# Patient Record
Sex: MALE | Race: WHITE | NOT HISPANIC OR LATINO | URBAN - METROPOLITAN AREA
[De-identification: names, ages, dates, MRNs, and addresses within clinical notes are randomized per-mention and may not be internally consistent; named-entity substitution may affect disease eponyms.]

---

## 2018-08-09 ENCOUNTER — INPATIENT (INPATIENT)
Facility: HOSPITAL | Age: 53
LOS: 1 days | Discharge: HOME | DRG: 247 | End: 2018-08-11
Attending: INTERNAL MEDICINE | Admitting: INTERNAL MEDICINE
Payer: COMMERCIAL

## 2018-08-09 VITALS
OXYGEN SATURATION: 97 % | RESPIRATION RATE: 18 BRPM | DIASTOLIC BLOOD PRESSURE: 111 MMHG | TEMPERATURE: 98 F | HEART RATE: 90 BPM | SYSTOLIC BLOOD PRESSURE: 151 MMHG | WEIGHT: 160.94 LBS

## 2018-08-09 DIAGNOSIS — I21.4 NON-ST ELEVATION (NSTEMI) MYOCARDIAL INFARCTION: ICD-10-CM

## 2018-08-09 DIAGNOSIS — R63.8 OTHER SYMPTOMS AND SIGNS CONCERNING FOOD AND FLUID INTAKE: ICD-10-CM

## 2018-08-09 DIAGNOSIS — Z91.89 OTHER SPECIFIED PERSONAL RISK FACTORS, NOT ELSEWHERE CLASSIFIED: ICD-10-CM

## 2018-08-09 DIAGNOSIS — Z29.9 ENCOUNTER FOR PROPHYLACTIC MEASURES, UNSPECIFIED: ICD-10-CM

## 2018-08-09 LAB
ALBUMIN SERPL ELPH-MCNC: 3.8 G/DL — SIGNIFICANT CHANGE UP (ref 3.4–5)
ALP SERPL-CCNC: 83 U/L — SIGNIFICANT CHANGE UP (ref 40–120)
ALT FLD-CCNC: 47 U/L — HIGH (ref 12–42)
ANION GAP SERPL CALC-SCNC: 4 MMOL/L — LOW (ref 9–16)
APTT BLD: 158.3 SEC — CRITICAL HIGH (ref 27.5–37.4)
APTT BLD: 33.2 SEC — SIGNIFICANT CHANGE UP (ref 27.5–36.5)
AST SERPL-CCNC: 38 U/L — HIGH (ref 15–37)
BASOPHILS NFR BLD AUTO: 0.5 % — SIGNIFICANT CHANGE UP (ref 0–2)
BILIRUB SERPL-MCNC: 0.3 MG/DL — SIGNIFICANT CHANGE UP (ref 0.2–1.2)
BUN SERPL-MCNC: 10 MG/DL — SIGNIFICANT CHANGE UP (ref 7–23)
CALCIUM SERPL-MCNC: 9.3 MG/DL — SIGNIFICANT CHANGE UP (ref 8.5–10.5)
CHLORIDE SERPL-SCNC: 103 MMOL/L — SIGNIFICANT CHANGE UP (ref 96–108)
CK MB CFR SERPL CALC: 28.1 NG/ML — HIGH (ref 0–6.7)
CK SERPL-CCNC: 334 U/L — HIGH (ref 30–200)
CO2 SERPL-SCNC: 32 MMOL/L — HIGH (ref 22–31)
CREAT SERPL-MCNC: 0.87 MG/DL — SIGNIFICANT CHANGE UP (ref 0.5–1.3)
EOSINOPHIL NFR BLD AUTO: 3.9 % — SIGNIFICANT CHANGE UP (ref 0–6)
GLUCOSE SERPL-MCNC: 98 MG/DL — SIGNIFICANT CHANGE UP (ref 70–99)
HCT VFR BLD CALC: 47.9 % — SIGNIFICANT CHANGE UP (ref 39–50)
HGB BLD-MCNC: 15.9 G/DL — SIGNIFICANT CHANGE UP (ref 13–17)
IMM GRANULOCYTES NFR BLD AUTO: 0.3 % — SIGNIFICANT CHANGE UP (ref 0–1.5)
INR BLD: 1 — SIGNIFICANT CHANGE UP (ref 0.88–1.16)
LYMPHOCYTES # BLD AUTO: 26.7 % — SIGNIFICANT CHANGE UP (ref 13–44)
MCHC RBC-ENTMCNC: 28.6 PG — SIGNIFICANT CHANGE UP (ref 27–34)
MCHC RBC-ENTMCNC: 33.2 G/DL — SIGNIFICANT CHANGE UP (ref 32–36)
MCV RBC AUTO: 86.2 FL — SIGNIFICANT CHANGE UP (ref 80–100)
MONOCYTES NFR BLD AUTO: 9.7 % — SIGNIFICANT CHANGE UP (ref 2–14)
NEUTROPHILS NFR BLD AUTO: 58.9 % — SIGNIFICANT CHANGE UP (ref 43–77)
PLATELET # BLD AUTO: 308 K/UL — SIGNIFICANT CHANGE UP (ref 150–400)
POTASSIUM SERPL-MCNC: 4.3 MMOL/L — SIGNIFICANT CHANGE UP (ref 3.5–5.3)
POTASSIUM SERPL-SCNC: 4.3 MMOL/L — SIGNIFICANT CHANGE UP (ref 3.5–5.3)
PROT SERPL-MCNC: 7.9 G/DL — SIGNIFICANT CHANGE UP (ref 6.4–8.2)
PROTHROM AB SERPL-ACNC: 11 SEC — SIGNIFICANT CHANGE UP (ref 9.8–12.7)
RBC # BLD: 5.56 M/UL — SIGNIFICANT CHANGE UP (ref 4.2–5.8)
RBC # FLD: 13.9 % — SIGNIFICANT CHANGE UP (ref 10.3–16.9)
SODIUM SERPL-SCNC: 139 MMOL/L — SIGNIFICANT CHANGE UP (ref 132–145)
TROPONIN I SERPL-MCNC: 0.65 NG/ML — CRITICAL HIGH (ref 0.02–0.06)
TROPONIN T SERPL-MCNC: 0.45 NG/ML — CRITICAL HIGH (ref 0–0.01)
WBC # BLD: 8.8 K/UL — SIGNIFICANT CHANGE UP (ref 3.8–10.5)
WBC # FLD AUTO: 8.8 K/UL — SIGNIFICANT CHANGE UP (ref 3.8–10.5)

## 2018-08-09 PROCEDURE — 93010 ELECTROCARDIOGRAM REPORT: CPT

## 2018-08-09 PROCEDURE — 71046 X-RAY EXAM CHEST 2 VIEWS: CPT | Mod: 26

## 2018-08-09 PROCEDURE — 99223 1ST HOSP IP/OBS HIGH 75: CPT | Mod: AI

## 2018-08-09 PROCEDURE — 99220: CPT

## 2018-08-09 PROCEDURE — 93010 ELECTROCARDIOGRAM REPORT: CPT | Mod: 59

## 2018-08-09 RX ORDER — METOPROLOL TARTRATE 50 MG
12.5 TABLET ORAL
Qty: 0 | Refills: 0 | Status: DISCONTINUED | OUTPATIENT
Start: 2018-08-09 | End: 2018-08-09

## 2018-08-09 RX ORDER — TICAGRELOR 90 MG/1
180 TABLET ORAL ONCE
Qty: 0 | Refills: 0 | Status: COMPLETED | OUTPATIENT
Start: 2018-08-09 | End: 2018-08-09

## 2018-08-09 RX ORDER — METOPROLOL TARTRATE 50 MG
12.5 TABLET ORAL
Qty: 0 | Refills: 0 | Status: DISCONTINUED | OUTPATIENT
Start: 2018-08-09 | End: 2018-08-11

## 2018-08-09 RX ORDER — HEPARIN SODIUM 5000 [USP'U]/ML
4400 INJECTION INTRAVENOUS; SUBCUTANEOUS EVERY 6 HOURS
Qty: 0 | Refills: 0 | Status: DISCONTINUED | OUTPATIENT
Start: 2018-08-09 | End: 2018-08-10

## 2018-08-09 RX ORDER — ATORVASTATIN CALCIUM 80 MG/1
40 TABLET, FILM COATED ORAL AT BEDTIME
Qty: 0 | Refills: 0 | Status: DISCONTINUED | OUTPATIENT
Start: 2018-08-09 | End: 2018-08-11

## 2018-08-09 RX ORDER — SODIUM CHLORIDE 9 MG/ML
3 INJECTION INTRAMUSCULAR; INTRAVENOUS; SUBCUTANEOUS ONCE
Qty: 0 | Refills: 0 | Status: COMPLETED | OUTPATIENT
Start: 2018-08-09 | End: 2018-08-09

## 2018-08-09 RX ORDER — HEPARIN SODIUM 5000 [USP'U]/ML
INJECTION INTRAVENOUS; SUBCUTANEOUS
Qty: 25000 | Refills: 0 | Status: DISCONTINUED | OUTPATIENT
Start: 2018-08-09 | End: 2018-08-09

## 2018-08-09 RX ORDER — HEPARIN SODIUM 5000 [USP'U]/ML
4400 INJECTION INTRAVENOUS; SUBCUTANEOUS ONCE
Qty: 0 | Refills: 0 | Status: COMPLETED | OUTPATIENT
Start: 2018-08-09 | End: 2018-08-09

## 2018-08-09 RX ORDER — HEPARIN SODIUM 5000 [USP'U]/ML
750 INJECTION INTRAVENOUS; SUBCUTANEOUS
Qty: 25000 | Refills: 0 | Status: DISCONTINUED | OUTPATIENT
Start: 2018-08-10 | End: 2018-08-10

## 2018-08-09 RX ORDER — TICAGRELOR 90 MG/1
90 TABLET ORAL
Qty: 0 | Refills: 0 | Status: DISCONTINUED | OUTPATIENT
Start: 2018-08-10 | End: 2018-08-11

## 2018-08-09 RX ADMIN — HEPARIN SODIUM 4400 UNIT(S): 5000 INJECTION INTRAVENOUS; SUBCUTANEOUS at 17:44

## 2018-08-09 RX ADMIN — TICAGRELOR 90 MILLIGRAM(S): 90 TABLET ORAL at 17:47

## 2018-08-09 RX ADMIN — SODIUM CHLORIDE 3 MILLILITER(S): 9 INJECTION INTRAMUSCULAR; INTRAVENOUS; SUBCUTANEOUS at 16:28

## 2018-08-09 RX ADMIN — HEPARIN SODIUM 900 UNIT(S)/HR: 5000 INJECTION INTRAVENOUS; SUBCUTANEOUS at 17:44

## 2018-08-09 RX ADMIN — ATORVASTATIN CALCIUM 40 MILLIGRAM(S): 80 TABLET, FILM COATED ORAL at 21:50

## 2018-08-09 RX ADMIN — Medication 12.5 MILLIGRAM(S): at 21:50

## 2018-08-09 NOTE — ED PROVIDER NOTE - CARDIAC, MLM
Normal rate, regular rhythm.  Heart sounds S1, S2.  No murmurs, rubs or gallops. +2 bilateral pulses.

## 2018-08-09 NOTE — ED ADULT NURSE REASSESSMENT NOTE - NS ED NURSE REASSESS COMMENT FT1
Patient is resting in stretcher in no acute distress. Maintained on cardiac monitor. Pt denies any chest pain or shortness of breath. Safety measures maintained. Will continue to monitor.

## 2018-08-09 NOTE — ED ADULT NURSE REASSESSMENT NOTE - NS ED NURSE REASSESS COMMENT FT1
Patient left ER in stable condition via stretcher with [facility] ambulance. Heparin drip infusing to right AC, site benign.

## 2018-08-09 NOTE — ED ADULT TRIAGE NOTE - CHIEF COMPLAINT QUOTE
Pt biba for [facility] md for c/o chest pain. pt states its been happening all month but today was worse. given asa 325mg and ekg done in ambulance.

## 2018-08-09 NOTE — ED PROVIDER NOTE - MEDICAL DECISION MAKING DETAILS
34 y/o male smoker with no PMHx presents with chest cramping radiating to the bilateral arms. Will do ACS work-up and if troponin negative, will have patient f/u with cardiology.

## 2018-08-09 NOTE — ED PROVIDER NOTE - CRITICAL CARE INDICATION, MLM
patient was critically ill... Patient was critically ill with a high probability of imminent or life threatening deterioration. Pt has NSTEMI given heparin pt having recurrent cramping chest pain repeating ekg currently.

## 2018-08-09 NOTE — ED CDU PROVIDER INITIAL DAY NOTE - MEDICAL DECISION MAKING DETAILS
32 y/o male smoker, no FH of MI with no PMHx presents with chest cramping radiating to the bilateral arms. Concern for ACS will get 2 troponin. Will repeat ekg in 4-6 hours. Pt got aspirin at outside hospital.

## 2018-08-09 NOTE — ED ADULT NURSE NOTE - CHIEF COMPLAINT QUOTE
Pt biba for [facility] md for c/o chest pain. pt states its been happening all month but today was worse. given asa 325mg and ekg done in ambulance.

## 2018-08-09 NOTE — H&P ADULT - PROBLEM SELECTOR PLAN 4
1) PCP Contacted on Admission: (Y/N) --> No. Name & Phone #:  Roni Anderson [phone]  2) Date of Contact with PCP:  3) PCP Contacted at Discharge: (Y/N)  4) Summary of Handoff Given to PCP:   5) Post-Discharge Appointment Date and Location:

## 2018-08-09 NOTE — ED ADULT NURSE NOTE - NSIMPLEMENTINTERV_GEN_ALL_ED
Implemented All Universal Safety Interventions:  [place] to call system. Call bell, personal items and telephone within reach. Instruct patient to call for assistance. Room bathroom lighting operational. Non-slip footwear when patient is off stretcher. Physically safe environment: no spills, clutter or unnecessary equipment. Stretcher in lowest position, wheels locked, appropriate side rails in place.

## 2018-08-09 NOTE — PATIENT PROFILE ADULT. - PURPOSEFUL PROACTIVE ROUNDING
I reviewed the H&P, I examined the patient, and there are no changes in the patient's condition.  VS were reviewed.    Vitals:    07/05/17 0929   BP: 129/71   Pulse: 71   Resp: 16   Temp: 97.6 °F (36.4 °C)      Patient

## 2018-08-09 NOTE — ED CDU PROVIDER INITIAL DAY NOTE - DETAILS
34 y/o male smoker, no FH of MI with no PMHx presents with chest cramping radiating to the bilateral arms. Concern for ACS will get 2 troponin

## 2018-08-09 NOTE — H&P ADULT - ATTENDING COMMENTS
Assessment: Patient personally seen and examined myself during rounds with the Physician Assistant/House Staff/Nurse Practitioner   ON DATE 8/9/18  Physician Assistant/House Staff/Nurse Practitioner note read, including vitals, physical findings, laboratory data, and radiological reports.   Revisions included below.   Direct personal management at bed side and extensive interpretation of the data.    Plan was outlined and discussed in details with the Physician Assistant/House Staff/Nurse Practitioner.    Decision making of high complexity   Risk high of complications, morbidity, and/or mortality  Assessment and Action taken for acute disease activity to reflect the level of care provided:  -Hemodynamic evaluation and support  -ACS assessment and treatment as applicable  -Heart failure assessment and treatment as applicable  -Cardiac Telemetry reviewed  -Medication reconciliation  -Review laboratory data  -EKG reviewed - no stemi  -Echo ordered  -Interdisciplinary discussion with IC / EP / HF / CTS teams as needed  TIME SPENT in evaluation and management, reassessments, review and interpretation of labs and x-rays, and hemodynamic management, formulating a plan and coordinating care: ___70____ MIN.  Time does not include procedural time.    Odell Alexis MD  Cardiology    Mobile: [phone]  Office: [phone]  [address] [place], [place] [address]

## 2018-08-09 NOTE — H&P ADULT - NSHPLABSRESULTS_GEN_ALL_CORE
.  LABS:                         15.9   8.8   )-----------( 308      ( 09 Aug 2018 16:26 )             47.9     08-09    139  |  103  |  10  ----------------------------<  98  4.3   |  32<H>  |  0.87    Ca    9.3      09 Aug 2018 16:26    TPro  7.9  /  Alb  3.8  /  TBili  0.3  /  DBili  x   /  AST  38<H>  /  ALT  47<H>  /  AlkPhos  83  08-09    PT/INR - ( 09 Aug 2018 17:51 )   PT: 11.0 sec;   INR: 1.00          PTT - ( 09 Aug 2018 17:51 )  PTT:33.2 sec    CARDIAC MARKERS ( 09 Aug 2018 20:10 )  x     / 0.45 ng/mL / x     / x     / x      CARDIAC MARKERS ( 09 Aug 2018 16:26 )  0.647 ng/mL / x     / x     / x     / x                RADIOLOGY, EKG & ADDITIONAL TESTS: Reviewed.

## 2018-08-09 NOTE — H&P ADULT - PROBLEM SELECTOR PLAN 1
Chest pain with radiation to L arm consistent, worsening over 6 weeks. ROMERO score 2 for 2 anginal episodes today and elevated trops.  - EKG with T wave flattening in leads III, aVF and V5, V6. No ST depressions.  - [facility], continue to trend q4-6 hrs  - s/p heparin load and on heparin gtt, monitor PTT q4-6hrs  - Lipitor 80mg  - metoprolol tartrate 12.5mg BID with holding parameters  - s/p ASA 325mg and Brilinta 180mg load  - NPO after MN for cath tomorrow Chest pain with radiation to L arm consistent, worsening over 6 weeks. ROMERO score 2 for 2 anginal episodes today and elevated trops.  - EKG with T wave flattening in leads III, aVF and V5, V6. No ST depressions.  - [place] [facility] [place] [facility] [place] 0.[place], continue to trend q4-6 hrs  - s/p heparin load and on heparin gtt, monitor PTT q4-6hrs  - Lipitor 40mg  - metoprolol tartrate 12.5mg BID with holding parameters  - s/p ASA 325mg and Brilinta 180mg load  - NPO after MN for cath tomorrow Chest pain with radiation to L arm consistent, worsening over 6 weeks. ROMERO score 2 for 2 anginal episodes today and elevated trops.  - EKG with T wave flattening in leads III, aVF and V5, V6. No ST depressions.  - [facility] 0.[facility], continue to trend q4-6 hrs  - s/p heparin load and on heparin gtt, monitor PTT q4-6hrs  - Lipitor 40mg  - metoprolol tartrate 12.5mg BID with holding parameters  - s/p ASA 325mg and Brilinta 180mg load  - NPO after MN for cath tomorrow  - consider initiating ACE/ARB before discharge Chest pain with radiation to L arm consistent, worsening over 6 weeks. ROMERO score 2 for 2 anginal episodes today and elevated trops.  - EKG with T wave flattening in leads III, aVF and V5, V6. No ST depressions.  - [facility], continue to trend q4-6 hrs  - s/p heparin load and on heparin gtt, monitor PTT q4-6hrs  - Lipitor 40mg  - metoprolol tartrate 12.5mg BID with holding parameters  - s/p ASA 325mg and Brilinta 180mg load  - Brilinta 90mg BID  - NPO after MN for cath tomorrow  - consider initiating ACE/ARB before discharge

## 2018-08-09 NOTE — H&P ADULT - ASSESSMENT
53M with distant history of asthma, no other PMH, presenting with intermittent chest pain radiating to L arm worsening over the last 6 weeks, transferred from [facility] for NSTEMI management.

## 2018-08-09 NOTE — ED PROVIDER NOTE - OBJECTIVE STATEMENT
52 y/o male (smoker, no FHx of MI, no Hx of diabetes, HTN, or HLD) presents to ED c/o CP. Patient s/p visiting the [facility] and getting 325mg aspirin. Presents with chest cramping that radiates to the bilateral shoulders and down the bilateral arms. Patient states that symptoms have been occurring weekly for the past month without exertion, diaphoresis, or any other associated symptoms, including SOB, back pain, nausea, vomiting, diarrhea, or abd pain. States that symptoms usually last for about 10-15 minutes, and had 2 episodes of pain today, the first at 9:30AM and the second at 2:30PM.

## 2018-08-09 NOTE — H&P ADULT - NSHPSOCIALHISTORY_GEN_ALL_CORE
active current smoker 36 pack-yrs, intermittent marijuana use, no current EtOH use, no other drug use

## 2018-08-09 NOTE — H&P ADULT - HISTORY OF PRESENT ILLNESS
53M with distant history of asthma, no other PMH, presenting with intermittent chest pain worsening over the last 6 weeks. Pt states that he is at rest when these chest pains emerge, initially lasting for 5 minutes with no recurrence for days, but now lasting longer for about 20 minutes and happening more frequently. Pain initially starts on his bilateral shoulders and goes to his chest, then radiates down his L arm. He went to [facility] today as he had 2 episodes of chest pain without any associated diaphoresis, nausea, or vomiting. Denies any chest pain prior to 6 weeks ago. +chronic night sweats, No fevers/chills, cough, palpitations, abdominal pain, diarhea/constipation, dysuria.    ED Vitals: T 97.6F, /111, HR 90, RR 18, O2 97% on RA  ED adm: heparin bolus and heparin gtt, Brilinta 180mg x1, PO aspirin 325mg on EMS  ED Labs: Troponin I 0.647

## 2018-08-10 ENCOUNTER — TRANSCRIPTION ENCOUNTER (OUTPATIENT)
Age: 53
End: 2018-08-10

## 2018-08-10 LAB
ANION GAP SERPL CALC-SCNC: 12 MMOL/L — SIGNIFICANT CHANGE UP (ref 5–17)
APTT BLD: 32.1 SEC — SIGNIFICANT CHANGE UP (ref 27.5–37.4)
APTT BLD: 36.7 SEC — SIGNIFICANT CHANGE UP (ref 27.5–37.4)
APTT BLD: 49.1 SEC — HIGH (ref 27.5–37.4)
BUN SERPL-MCNC: 8 MG/DL — SIGNIFICANT CHANGE UP (ref 7–23)
CALCIUM SERPL-MCNC: 9.7 MG/DL — SIGNIFICANT CHANGE UP (ref 8.4–10.5)
CHLORIDE SERPL-SCNC: 101 MMOL/L — SIGNIFICANT CHANGE UP (ref 96–108)
CHOLEST SERPL-MCNC: 203 MG/DL — HIGH (ref 10–199)
CK MB CFR SERPL CALC: 12.5 NG/ML — HIGH (ref 0–6.7)
CK MB CFR SERPL CALC: 21.1 NG/ML — HIGH (ref 0–6.7)
CK MB CFR SERPL CALC: 28.3 NG/ML — HIGH (ref 0–6.7)
CK SERPL-CCNC: 184 U/L — SIGNIFICANT CHANGE UP (ref 30–200)
CK SERPL-CCNC: 322 U/L — HIGH (ref 30–200)
CO2 SERPL-SCNC: 26 MMOL/L — SIGNIFICANT CHANGE UP (ref 22–31)
CREAT SERPL-MCNC: 0.77 MG/DL — SIGNIFICANT CHANGE UP (ref 0.5–1.3)
GLUCOSE SERPL-MCNC: 101 MG/DL — HIGH (ref 70–99)
HBA1C BLD-MCNC: 5.9 % — HIGH (ref 4–5.6)
HCT VFR BLD CALC: 50.4 % — HIGH (ref 39–50)
HDLC SERPL-MCNC: 49 MG/DL — SIGNIFICANT CHANGE UP (ref 40–125)
HGB BLD-MCNC: 16.2 G/DL — SIGNIFICANT CHANGE UP (ref 13–17)
INR BLD: 1.04 — SIGNIFICANT CHANGE UP (ref 0.88–1.16)
INR BLD: 1.04 — SIGNIFICANT CHANGE UP (ref 0.88–1.16)
LIPID PNL WITH DIRECT LDL SERPL: 128 MG/DL — SIGNIFICANT CHANGE UP
MAGNESIUM SERPL-MCNC: 2.2 MG/DL — SIGNIFICANT CHANGE UP (ref 1.6–2.6)
MCHC RBC-ENTMCNC: 28.4 PG — SIGNIFICANT CHANGE UP (ref 27–34)
MCHC RBC-ENTMCNC: 32.1 G/DL — SIGNIFICANT CHANGE UP (ref 32–36)
MCV RBC AUTO: 88.3 FL — SIGNIFICANT CHANGE UP (ref 80–100)
PCP SPEC-MCNC: SIGNIFICANT CHANGE UP
PHOSPHATE SERPL-MCNC: 3 MG/DL — SIGNIFICANT CHANGE UP (ref 2.5–4.5)
PLATELET # BLD AUTO: 310 K/UL — SIGNIFICANT CHANGE UP (ref 150–400)
POTASSIUM SERPL-MCNC: 4.3 MMOL/L — SIGNIFICANT CHANGE UP (ref 3.5–5.3)
POTASSIUM SERPL-SCNC: 4.3 MMOL/L — SIGNIFICANT CHANGE UP (ref 3.5–5.3)
PROTHROM AB SERPL-ACNC: 11.5 SEC — SIGNIFICANT CHANGE UP (ref 9.8–12.7)
PROTHROM AB SERPL-ACNC: 11.6 SEC — SIGNIFICANT CHANGE UP (ref 9.8–12.7)
RBC # BLD: 5.71 M/UL — SIGNIFICANT CHANGE UP (ref 4.2–5.8)
RBC # FLD: 14.3 % — SIGNIFICANT CHANGE UP (ref 10.3–16.9)
SODIUM SERPL-SCNC: 139 MMOL/L — SIGNIFICANT CHANGE UP (ref 135–145)
TOTAL CHOLESTEROL/HDL RATIO MEASUREMENT: 4.1 RATIO — SIGNIFICANT CHANGE UP (ref 3.4–9.6)
TRIGL SERPL-MCNC: 130 MG/DL — SIGNIFICANT CHANGE UP (ref 10–149)
TROPONIN T SERPL-MCNC: 0.24 NG/ML — CRITICAL HIGH (ref 0–0.01)
TROPONIN T SERPL-MCNC: 0.47 NG/ML — CRITICAL HIGH (ref 0–0.01)
TROPONIN T SERPL-MCNC: 0.73 NG/ML — CRITICAL HIGH (ref 0–0.01)
TSH SERPL-MCNC: 1.86 UIU/ML — SIGNIFICANT CHANGE UP (ref 0.35–4.94)
WBC # BLD: 8.8 K/UL — SIGNIFICANT CHANGE UP (ref 3.8–10.5)
WBC # FLD AUTO: 8.8 K/UL — SIGNIFICANT CHANGE UP (ref 3.8–10.5)

## 2018-08-10 PROCEDURE — 71045 X-RAY EXAM CHEST 1 VIEW: CPT | Mod: 26

## 2018-08-10 PROCEDURE — 92928 PRQ TCAT PLMT NTRAC ST 1 LES: CPT | Mod: LC

## 2018-08-10 PROCEDURE — 99232 SBSQ HOSP IP/OBS MODERATE 35: CPT

## 2018-08-10 PROCEDURE — 93306 TTE W/DOPPLER COMPLETE: CPT | Mod: 26

## 2018-08-10 PROCEDURE — 93458 L HRT ARTERY/VENTRICLE ANGIO: CPT | Mod: 26,XU

## 2018-08-10 PROCEDURE — 93010 ELECTROCARDIOGRAM REPORT: CPT

## 2018-08-10 RX ORDER — ASPIRIN/CALCIUM CARB/MAGNESIUM 324 MG
81 TABLET ORAL DAILY
Qty: 0 | Refills: 0 | Status: DISCONTINUED | OUTPATIENT
Start: 2018-08-10 | End: 2018-08-11

## 2018-08-10 RX ORDER — NICOTINE POLACRILEX 2 MG
1 GUM BUCCAL DAILY
Qty: 0 | Refills: 0 | Status: DISCONTINUED | OUTPATIENT
Start: 2018-08-10 | End: 2018-08-11

## 2018-08-10 RX ORDER — NICOTINE POLACRILEX 2 MG
1 GUM BUCCAL DAILY
Qty: 0 | Refills: 0 | Status: DISCONTINUED | OUTPATIENT
Start: 2018-08-10 | End: 2018-08-10

## 2018-08-10 RX ORDER — SODIUM CHLORIDE 9 MG/ML
1000 INJECTION INTRAMUSCULAR; INTRAVENOUS; SUBCUTANEOUS
Qty: 0 | Refills: 0 | Status: DISCONTINUED | OUTPATIENT
Start: 2018-08-10 | End: 2018-08-10

## 2018-08-10 RX ORDER — SODIUM CHLORIDE 9 MG/ML
1000 INJECTION INTRAMUSCULAR; INTRAVENOUS; SUBCUTANEOUS
Qty: 0 | Refills: 0 | Status: DISCONTINUED | OUTPATIENT
Start: 2018-08-10 | End: 2018-08-11

## 2018-08-10 RX ADMIN — Medication 12.5 MILLIGRAM(S): at 10:21

## 2018-08-10 RX ADMIN — SODIUM CHLORIDE 100 MILLILITER(S): 9 INJECTION INTRAMUSCULAR; INTRAVENOUS; SUBCUTANEOUS at 10:01

## 2018-08-10 RX ADMIN — TICAGRELOR 90 MILLIGRAM(S): 90 TABLET ORAL at 06:36

## 2018-08-10 RX ADMIN — Medication 81 MILLIGRAM(S): at 10:06

## 2018-08-10 RX ADMIN — HEPARIN SODIUM 7.5 UNIT(S)/HR: 5000 INJECTION INTRAVENOUS; SUBCUTANEOUS at 00:24

## 2018-08-10 RX ADMIN — Medication 12.5 MILLIGRAM(S): at 21:07

## 2018-08-10 RX ADMIN — SODIUM CHLORIDE 100 MILLILITER(S): 9 INJECTION INTRAMUSCULAR; INTRAVENOUS; SUBCUTANEOUS at 13:47

## 2018-08-10 RX ADMIN — ATORVASTATIN CALCIUM 40 MILLIGRAM(S): 80 TABLET, FILM COATED ORAL at 21:08

## 2018-08-10 RX ADMIN — TICAGRELOR 90 MILLIGRAM(S): 90 TABLET ORAL at 18:40

## 2018-08-10 NOTE — PROGRESS NOTE ADULT - PROBLEM SELECTOR PLAN 1
Chest pain with radiation to L arm consistent, worsening over 6 weeks. ROMERO score 2 for 2 anginal episodes today and elevated trops.  - EKG with T wave flattening in leads III, aVF and V5, V6. No ST depressions.  - [facility], continue to trend q4-6 hrs  - s/p heparin load and on heparin gtt, monitor PTT q4-6hrs  - Lipitor 40mg  - metoprolol tartrate 12.5mg BID with holding parameters  - s/p ASA 325mg and Brilinta 180mg load  - Brilinta 90mg BID  - NPO after MN for cath tomorrow  - consider initiating ACE/ARB before discharge Typical Chest pain, elevated trops & LHGV EKG v5/v6 depressions consistent with NSTEMI. Pt started on Heparin loading dose/Drip and transferred here for management.  -Cath for today 8/10   -Heparin drip, ASA, Metoprolol tart 12.5 BID, Brilinta 90mg BID, Atorvastatin 40 qd.  -PTT q4-6hr   - Start ACEI/ARB before discharge Typical Chest pain, elevated trops & LHGV EKG v5/v6 depressions consistent with NSTEMI. Pt started on Heparin loading dose/Drip and transferred here for management. A1C 5.9; Lipid Panel: High Chol 203, Upper normal / [id number];  -Cath for today 8/10   -Heparin drip, ASA, Metoprolol tart 12.5 BID, Brilinta 90mg BID, Atorvastatin 40 qd.  -Trop pending peak   -PTT q4-6hr   -Pending TSH   -Pending Utox- although pt denies cocaine use   - Start ACEI/ARB before discharge

## 2018-08-10 NOTE — DISCHARGE NOTE ADULT - CARE PLAN
Principal Discharge DX:	Non-ST elevation (NSTEMI) myocardial infarction  Goal:	to prevent future episode  Assessment and plan of treatment:	You came in with typical anginal chest pain that increased in frequency and duration. You were found to have elevation cardiac enzymes as well as EKG changes consistent with a NSTEMI (heart attack). We started you on medications to decrease further plaque formation and you received a PCI w/ 1 stent placement.   -Follow up with Cardiology Dr. Alexis 8/16/18 4pm and your PCP Dr. Roni Anderson on 8/13/18 so you can be followed in respect to the medications we have prescribed for you as well as preventing reoccurrence.   -We spoke about smoking cessation, and you were agreeable and preferred to start without Nicotine assistance. If you begin to have cravings/start smoking again, contact your PCP for nicotine patch and gum treatment. As previously discussed, smoking increases your risk for future heart attacks, therefore quitting would be a great advancement for your health. In addition, exercise will allopw   -Please follow a low fat/cholesterol diet to prevent future plaque formation.   -Medications: ASA1/day & Brilinta 90 2x/day: Dual antiplatelet therapy required to prevent re-stenosis.  -[id number] daily to ensure cholesterol remains at a low level   -Lopressor 12.5 2x/day  Secondary Diagnosis:	Tobacco dependence Principal Discharge DX:	Non-ST elevation (NSTEMI) myocardial infarction  Goal:	to prevent future episode  Assessment and plan of treatment:	You came in with typical anginal chest pain that increased in frequency and duration. You were found to have elevation cardiac enzymes as well as EKG changes consistent with a NSTEMI (heart attack). We started you on medications to decrease further plaque formation and you received a PCI w/ 1 stent placement.   -Follow up with Cardiology Dr. Alexis 8/16/18 4pm and your PCP Dr. Roni Anderson on 8/13/18 so you can be followed in respect to the medications we have prescribed for you as well as preventing reoccurrence.   -We spoke about smoking cessation, and you were agreeable and preferred to start without Nicotine assistance. If you begin to have cravings/start smoking again, contact your PCP for nicotine patch and gum treatment. As previously discussed, smoking increases your risk for future heart attacks, therefore quitting would be a great advancement for your health.    -Please follow a low fat/cholesterol diet to prevent future plaque formation.   -Medications: ASA1/day & Brilinta 90 2x/day: Dual antiplatelet therapy required for 1 year to prevent re-stenosis.  -[id number] daily to ensure cholesterol remains at a low level   -Toprol 25 daily  Secondary Diagnosis:	Tobacco dependence

## 2018-08-10 NOTE — PROGRESS NOTE ADULT - PROBLEM SELECTOR PLAN 3
F: heparin gtt  E: replete PRN  N: DASH diet, NPO after MN for possible cath F: pre-cath 1L NS fluids   E: replete PRN  N: NPO for procedure/DASH diet

## 2018-08-10 NOTE — DISCHARGE NOTE ADULT - MEDICATION SUMMARY - MEDICATIONS TO TAKE
I will START or STAY ON the medications listed below when I get home from the hospital:    aspirin 81 mg oral delayed release tablet  -- 1 tab(s) by mouth once a day  -- Indication: For NSTEMI    atorvastatin 40 mg oral tablet  -- 1 tab(s) by mouth once a day (at bedtime)  -- Indication: For NSTEMI    ticagrelor 90 mg oral tablet  -- 1 tab(s) by mouth 2 times a day  -- Indication: For NSTEMI    Toprol-XL 25 mg oral tablet, extended release  -- 1 tab(s) by mouth once a day   -- It is very important that you take or use this exactly as directed.  Do not skip doses or discontinue unless directed by your doctor.  May cause drowsiness.  Alcohol may intensify this effect.  Use care when operating dangerous machinery.  Some non-prescription drugs may aggravate your condition.  Read all labels carefully.  If a warning appears, check with your doctor before taking.  Swallow whole.  Do not crush.  Take with food or milk.  This drug may impair the ability to drive or operate machinery.  Use care until you become familiar with its effects.    -- Indication: For NSTEMI

## 2018-08-10 NOTE — DISCHARGE NOTE ADULT - HOSPITAL COURSE
53M with PMH of Anxiety, SocHX of Tobacco use 1ppd, occasional Marijuana use, and Past Heroine use (snorting modality in the 1980s) who presented to [facility] on 8/9/18 with chest pain for 6 week duration that had both increased in duration and frequency, found to have increased Trop/CKMb, EKG showing ST depressions in V5/V6 transferred from [facility] for NSTEMI catheterization/ management along with Heparin loading dose/heparin drip. Patient here was started on ASA, Brilinta, Lipitor, and Lopressor. 8/10 Interventional cath was done: VENKATA placed in pLCX  Proximal RCA 30%. LM and LAD normal. Radial band removed w/o evidence of hematoma. Echo followed showing EF 65% with normal LV wall thickness, movement, and size. TSHnL, A1C 5.9; Lipid Panel: High Chol 203, Upper normal / [id number]. Pt was stable with VSS and ready for discharge.

## 2018-08-10 NOTE — PROGRESS NOTE ADULT - SUBJECTIVE AND OBJECTIVE BOX
Interventional Cardiology PA Precath Note      HPI:      53M smoker with distant history of asthma, no other PMH, presenting with intermittent chest pain worsening over the last 6 weeks. Pt states that he is at rest when these chest pains emerge, initially lasting for 5 minutes with no recurrence for days, but now lasting longer for about 20 minutes and happening more frequently. Pain initially starts on his bilateral shoulders and goes to his chest, then radiates down his L arm. He went to [facility] on 8/9/18 as he had 2 episodes of chest pain without any associated diaphoresis, nausea, or vomiting. Denies any chest pain prior to 6 weeks ago. +chronic night sweats, No fevers/chills, cough, palpitations, abdominal pain, diarrhea/constipation, dysuria.    [facility] ED Vitals: T 97.6F, /111, HR 90, RR 18, O2 97% on RA, Troponin I 0.647.  Pt was given heparin bolus and heparin gtt, Brilinta 180mg x1, PO aspirin 325mg on EMS.      At [facility], Troponin T trend 0.45à0.73.  ECG shows .............  Pt remains CP free at this time.  Pt is now referred for recommended cardiac cathetherization with possible intervention in setting of NSTEMI.     PMH:    PSH:    No Known Allergies      ALL: NKDA, NKFA. Denies shellfish/Contrast dye allergy.  SocHX:   FHx:   MEDS:   atorvastatin 40 milliGRAM(s) Oral at bedtime  heparin  Infusion 750 Unit(s)/Hr IV Continuous <Continuous>  heparin  Injectable 4400 Unit(s) IV Push every 6 hours PRN  metoprolol tartrate 12.5 milliGRAM(s) Oral two times a day  ticagrelor 90 milliGRAM(s) Oral two times a day    T(C): 36.4 (08-10-18 @ 05:53), Max: 36.8 (08-09-18 @ 17:43)  HR: 74 (08-10-18 @ 08:54) (70 - 90)  BP: 122/94 (08-10-18 @ 08:54) (118/94 - 158/95)  RR: 18 (08-10-18 @ 08:54) (16 - 20)  SpO2: 99% (08-10-18 @ 08:54) (97% - 100%)  Wt(kg): --  HEENT: NCAT, EOMI, PERRLA  NECK: No JVD, No carotid bruits B/L, +2 Carotid pulses B/L  PULM:  CTA B/L No W/R/R  CARD: RRR, +S1, +S2, No M/R/G  ABD: ND, +BS, NT, no masses  EXT: Warm, pedal edema yes/no, pitting/non-pitting  NEURO: A & O x 3, no focal neurologic deficits  PULSES:	   B	       R	                FEM         	                                 DP/PT  Right		                                Yes/No     Bruit	    Left		                                        Yes/No     Bruit                                16.2   8.8   )-----------( 310      ( 10 Aug 2018 07:38 )             50.4     08-10    139  |  101  |  8   ----------------------------<  101<H>  4.3   |  26  |  0.77    Ca    9.7      10 Aug 2018 07:37  Phos  3.0     08-10  Mg     2.2     08-10    TPro  7.9  /  Alb  3.8  /  TBili  0.3  /  DBili  x   /  AST  38<H>  /  ALT  47<H>  /  AlkPhos  83  08-09    CARDIAC MARKERS ( 10 Aug 2018 07:37 )  x     / x     / 273 U/L / x     / 21.1 ng/mL  CARDIAC MARKERS ( 10 Aug 2018 00:17 )  x     / 0.73 ng/mL / 322 U/L / x     / 28.3 ng/mL  CARDIAC MARKERS ( 09 Aug 2018 20:10 )  x     / 0.45 ng/mL / 334 U/L / x     / 28.1 ng/mL  CARDIAC MARKERS ( 09 Aug 2018 16:26 )  0.647 ng/mL / x     / x     / x     / x              EKG:					  ASA _____				Mallampati class: _________	            Anginal Class: _________  A/P:        Sedation Plan:   ? None   ? Moderate    ?  Deep    ?  General Anesthesia   Patient Is Suitable Candidate For Sedation?     ? Yes   ? No   ? Not Applicable     Risks & benefits of procedure and sedation and risks and benefits for the alternative therapy have been explained to the patient in layman’s terms including but not limited to: allergic reaction, bleeding, infection, arrhythmia, respiratory compromise, renal and vascular compromise, limb damage, MI, CVA, emergent CABG/Vascular Surgery and death. Informed consent obtained and in chart. Interventional Cardiology PA Precath Note      HPI:      53M smoker with FHx atherosclerotic disease & distant history of asthma 2/2 snorting Heroin, recreational marijuana user with no other PMH, presenting with intermittent chest pain worsening over the last 6 weeks. Pt states that he is at rest when these chest pains emerge, initially lasting for 5 minutes with no recurrence for days, but now lasting longer for about 20 minutes and happening more frequently. Pain initially starts on his bilateral shoulders and goes to his chest, then radiates down his L arm. He went to [facility] on 18 as he had 2 episodes of chest pain without any associated diaphoresis, nausea, or vomiting. Denies any chest pain prior to 6 weeks ago. +chronic night sweats, No fevers/chills, cough, palpitations, abdominal pain, diarrhea/constipation, dysuria, orthopnea/pnd, pedal edema, syncope.  No prior cardiac stress test or ECHO.      [facility] ED Vitals: T 97.6F, /111, HR 90, RR 18, O2 97% on RA, Troponin I 0.647.  Pt was given heparin bolus and heparin gtt, Brilinta 180mg x1, PO aspirin 325mg on EMS.      At [facility], Troponin T trend 0.45à0.73.  ECG shows SR with inferior TW flattening.  Pt remains CP free at this time.  Pt is now referred for recommended cardiac cathetherization with possible intervention in setting of NSTEMI.     PMH:  Anxiety, fractured collar bone  PSH:  none  No Known Allergies      ALL: NKDA, NKFA. Denies shellfish/Contrast dye allergy.  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 ; No current EtOH use x 3 years; Recreational Marijuana user  FHx: Father carotid artery disease, unknown if Heart disease,  age 74;    MEDS:   atorvastatin 40 milliGRAM(s) Oral at bedtime  heparin  Infusion 750 Unit(s)/Hr IV Continuous <Continuous>  heparin  Injectable 4400 Unit(s) IV Push every 6 hours PRN  metoprolol tartrate 12.5 milliGRAM(s) Oral two times a day  ticagrelor 90 milliGRAM(s) Oral two times a day    T(C): 36.4 (08-10-18 @ 05:53), Max: 36.8 (18 @ 17:43)  HR: 74 (08-10-18 @ 08:54) (70 - 90)  BP: 122/94 (08-10-18 @ 08:54) (118/94 - 158/95)  RR: 18 (08-10-18 @ 08:54) (16 - 20)  SpO2: 99% (08-10-18 @ 08:54) (97% - 100%)  Wt(kg): --  HEENT: NCAT, EOMI, PERRLA  NECK: No JVD, No carotid bruits B/L, +2 Carotid pulses B/L  PULM:  CTA B/L No W/R/R  CARD: RRR, +S1, +S2, No M/R/G  ABD: ND, +BS, NT, no masses  EXT: Warm, pedal edema yes/no, pitting/non-pitting  NEURO: A & O x 3, no focal neurologic deficits  PULSES:	   B	       R	                FEM         	          DP/PT  Right	+2 	      +2                  2+ No     Bruit         +1 / +1	    Left	+2 	      +2                  2+ No     Bruit         +1 / + 1                                16.2   8.8   )-----------( 310      ( 10 Aug 2018 07:38 )             50.4     08-10    139  |  101  |  8   ----------------------------<  101<H>  4.3   |  26  |  0.77    Ca    9.7      10 Aug 2018 07:37  Phos  3.0     08-10  Mg     2.2     -10    TPro  7.9  /  Alb  3.8  /  TBili  0.3  /  DBili  x   /  AST  38<H>  /  ALT  47<H>  /  AlkPhos  83  08-09    CARDIAC MARKERS ( 10 Aug 2018 07:37 )  x     / x     / 273 U/L / x     / 21.1 ng/mL  CARDIAC MARKERS ( 10 Aug 2018 00:17 )  x     / 0.73 ng/mL / 322 U/L / x     / 28.3 ng/mL  CARDIAC MARKERS ( 09 Aug 2018 20:10 )  x     / 0.45 ng/mL / 334 U/L / x     / 28.1 ng/mL  CARDIAC MARKERS ( 09 Aug 2018 16:26 )  0.647 ng/mL / x     / x     / x     / x            ASA __3___				Mallampati class: __3_______	            Anginal Class: ___4______        Sedation Plan:   ? None   ? Moderate    ?  Deep    ?  General Anesthesia   Patient Is Suitable Candidate For Sedation?     ? Yes   ? No   ? Not Applicable Interventional Cardiology PA Precath Note      HPI:      53M smoker with FHx atherosclerotic disease & distant history of asthma 2/2 snorting Heroin, recreational marijuana user with no other PMH, presenting with intermittent chest pain worsening over the last 6 weeks. Pt states that he is at rest when these chest pains emerge, initially lasting for 5 minutes with no recurrence for days, but now lasting longer for about 20 minutes and happening more frequently. Pain initially starts on his bilateral shoulders and goes to his chest, then radiates down his L arm. He went to [facility] on 18 as he had 2 episodes of chest pain without any associated diaphoresis, nausea, or vomiting. Denies any chest pain prior to 6 weeks ago. +chronic night sweats, No fevers/chills, cough, palpitations, abdominal pain, diarrhea/constipation, dysuria, orthopnea/pnd, pedal edema, syncope.  No prior cardiac stress test or ECHO.      [facility] ED Vitals: T 97.6F, /111, HR 90, RR 18, O2 97% on RA, Troponin I 0.647.  Pt was given heparin bolus and heparin gtt, Brilinta 180mg x1, PO aspirin 325mg on EMS.      At [facility], Troponin T trend 0.45à0.73.  ECG shows SR with inferior TW flattening.  Pt remains CP free at this time.  Pt is now referred for recommended cardiac cathetherization with possible intervention in setting of NSTEMI.     PMH:  Anxiety, fractured collar bone  PSH:  none    ALL: NKDA, NKFA. Denies shellfish/Contrast dye allergy.  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 ; No current EtOH use x 3 years; Recreational Marijuana user  FHx: Father carotid artery disease, unknown if Heart disease,  age 74;    MEDS:   atorvastatin 40 milliGRAM(s) Oral at bedtime  heparin  Infusion 750 Unit(s)/Hr IV Continuous <Continuous>  heparin  Injectable 4400 Unit(s) IV Push every 6 hours PRN  metoprolol tartrate 12.5 milliGRAM(s) Oral two times a day  ticagrelor 90 milliGRAM(s) Oral two times a day    T(C): 36.4 (08-10-18 @ 05:53), Max: 36.8 (18 @ 17:43)  HR: 74 (08-10-18 @ 08:54) (70 - 90)  BP: 122/94 (08-10-18 @ 08:54) (118/94 - 158/95)  RR: 18 (08-10-18 @ 08:54) (16 - 20)  SpO2: 99% (08-10-18 @ 08:54) (97% - 100%)  Wt(kg): --    General: WDWN laying comfortably in bed in NAD   HEENT: NCAT, EOMI, PERRLA  NECK: No JVD, No carotid bruits B/L, +2 Carotid pulses B/L  PULM:  CTA B/L No W/R/R  CARD: RRR, +S1, +S2, No M/R/G  ABD: soft, ND, +BS, NT, no masses  EXT: Warm, no pedal edema   Skin: multiple tattoos on body; small areas of ecchymosis on left upper arm  NEURO: A & O x 3, no focal neurologic deficits  PULSES:	   B	       R	                FEM         	          DP/PT  Right	+2 	      +2                  2+ No     Bruit         +1 / +1	    Left	+2 	      +2                  2+ No     Bruit         +1 / + 1    Aníbal's Test: Positive b/l; cap refill < 3 sec                            16.2   8.8   )-----------( 310      ( 10 Aug 2018 07:38 )             50.4     08-10    139  |  101  |  8   ----------------------------<  101<H>  4.3   |  26  |  0.77    Ca    9.7      10 Aug 2018 07:37  Phos  3.0     08-10  Mg     2.2     08-10    TPro  7.9  /  Alb  3.8  /  TBili  0.3  /  DBili  x   /  AST  38<H>  /  ALT  47<H>  /  AlkPhos  83  08-09    CARDIAC MARKERS ( 10 Aug 2018 07:37 )  x     / x     / 273 U/L / x     / 21.1 ng/mL  CARDIAC MARKERS ( 10 Aug 2018 00:17 )  x     / 0.73 ng/mL / 322 U/L / x     / 28.3 ng/mL  CARDIAC MARKERS ( 09 Aug 2018 20:10 )  x     / 0.45 ng/mL / 334 U/L / x     / 28.1 ng/mL  CARDIAC MARKERS ( 09 Aug 2018 16:26 )  0.647 ng/mL / x     / x     / x     / x            ASA __3___				Mallampati class: __3_______	            Anginal Class: ___4______        Sedation Plan:   Moderate    Patient Is Suitable Candidate For Sedation?     Yes

## 2018-08-10 NOTE — DISCHARGE NOTE ADULT - PATIENT PORTAL LINK FT
You can access the [email or web address] Patient Portal, offered by [facility], by registering with the following website: [email or web address]

## 2018-08-10 NOTE — PROGRESS NOTE ADULT - ASSESSMENT
53M with PMH of Anxiety, SocHX of Tobacco use 1ppd, occasional Marijuana use, and Past Heroine use (snorting modality in the 1980s) who presented to [facility] with chest pain for 6 week duration that has both increased in duration and frequency found to have increased Trop/CKMb, EKG showing ST depressions in V5/V6 53M with PMH of Anxiety, SocHX of Tobacco use 1ppd, occasional Marijuana use, and Past Heroine use (snorting modality in the 1980s) who presented to [facility] with chest pain for 6 week duration that has both increased in duration and frequency found to have increased Trop/CKMb, EKG showing ST depressions in V5/V6 transferred from [facility] for NSTEMI catheterization/ management.

## 2018-08-10 NOTE — CONSULT NOTE ADULT - SUBJECTIVE AND OBJECTIVE BOX
JOSY OLIVEIRA    [id number]    Patient is a 53y old  Male who presents with a chief complaint of chest pain, r/o ACS (09 Aug 2018 20:08)      HPI:  53M with distant history of asthma, no other PMH, presenting with intermittent chest pain worsening over the last 6 weeks. Pt states that he is at rest when these chest pains emerge, initially lasting for 5 minutes with no recurrence for days, but now lasting longer for about 20 minutes and happening more frequently. Pain initially starts on his bilateral shoulders and goes to his chest, then radiates down his L arm. He went to [facility] today as he had 2 episodes of chest pain without any associated diaphoresis, nausea, or vomiting. Denies any chest pain prior to 6 weeks ago. +chronic night sweats, No fevers/chills, cough, palpitations, abdominal pain, diarhea/constipation, dysuria.    ED Vitals: T 97.6F, /111, HR 90, RR 18, O2 97% on RA  ED adm: heparin bolus and heparin gtt, Brilinta 180mg x1, PO aspirin 325mg on EMS  ED Labs: Troponin I 0.647 (09 Aug 2018 20:08)      PAST MEDICAL & SURGICAL HISTORY:        Social History:    FAMILY HISTORY:  No pertinent family history in first degree relatives      Functional Level Prior to Admission:                          16.2   8.8   )-----------( 310      ( 10 Aug 2018 07:38 )             50.4       08-10    139  |  101  |  8   ----------------------------<  101<H>  4.3   |  26  |  0.77    Ca    9.7      10 Aug 2018 07:37  Phos  3.0     08-10  Mg     2.2     08-10    TPro  7.9  /  Alb  3.8  /  TBili  0.3  /  DBili  x   /  AST  38<H>  /  ALT  47<H>  /  AlkPhos  83  08-09      Vital Signs Last 24 Hrs  T(C): 36.8 (10 Aug 2018 09:24), Max: 36.8 (09 Aug 2018 17:43)  T(F): 98.2 (10 Aug 2018 09:24), Max: 98.2 (09 Aug 2018 17:43)  HR: 74 (10 Aug 2018 08:54) (70 - 90)  BP: 122/94 (10 Aug 2018 08:54) (118/94 - 158/95)  BP(mean): 110 (10 Aug 2018 08:54) (103 - 123)  RR: 18 (10 Aug 2018 08:54) (16 - 20)  SpO2: 99% (10 Aug 2018 08:54) (97% - 100%)      PHYSICAL EXAM:  This 53 y-o male was admitted on 08/09/18 with chest pain and pain in both arms , intermittent for 6 weeks..  h/o asthma      Constitutional:  sitting on edge of bed, alert and stable, in good spirits.    Eyes:  neg.    ENMT:  neg.    Neck:  supple, no neck pain    Breasts:    Back:  no back pain    Respiratory:  no SOB     Cardiovascular:  denies chest pain.  no palpitation    Gastrointestinal: no abdominal pain    Genitourinary:    Rectal:    Extremities: full ROM 4 ext., no joint or aaaaarm pain, no edema, no weakness    Vascular:    Neurological:  no neurological deficits, 5/5.  Independent ambulation    Skin:    Lymph Nodes:    Musculoskeletal:    Psychiatric:            Impression:  NSTEMI    Recommendations:  Independent in all areas.  No PT needs at this time

## 2018-08-10 NOTE — PROGRESS NOTE ADULT - SUBJECTIVE AND OBJECTIVE BOX
53M with distant history of asthma, no other PMH, presenting with intermittent chest pain worsening over the last 6 weeks. Pt states that he is at rest when these chest pains emerge, initially lasting for 5 minutes with no recurrence for days, but now lasting longer for about 20 minutes and happening more frequently. Pain initially starts on his bilateral shoulders and goes to his chest, then radiates down his L arm. He went to [facility] today as he had 2 episodes of chest pain without any associated diaphoresis, nausea, or vomiting. Denies any chest pain prior to 6 weeks ago. +chronic night sweats, No fevers/chills, cough, palpitations, abdominal pain, diarhea/constipation, dysuria.    ED Vitals: T 97.6F, /111, HR 90, RR 18, O2 97% on RA  ED adm: heparin bolus and heparin gtt, Brilinta 180mg x1, PO aspirin 325mg on EMS  ED Labs: Troponin I 0.647        PHYSICAL EXAM:    General:  male in NAD   HEENT: NC/AT, anicteric sclera; MMM  Neck: supple no JVD   Cardiovascular: +S1/S2, RRR  Respiratory: CTA B/L;   Gastrointestinal: soft, NT/ND; +BSx4  Extremities: WWP; no edema  Vascular: 2+ radial, DP/PT pulses B/L  Neurological: AAOx3; no focal deficits    MEDICATIONS:  MEDICATIONS  (STANDING):  aspirin enteric coated 81 milliGRAM(s) Oral daily  atorvastatin 40 milliGRAM(s) Oral at bedtime  heparin  Infusion 750 Unit(s)/Hr (7.5 mL/Hr) IV Continuous <Continuous>  metoprolol tartrate 12.5 milliGRAM(s) Oral two times a day  nicotine - 21 mG/24Hr(s) Patch 1 patch Transdermal daily  sodium chloride 0.9%. 1000 milliLiter(s) (100 mL/Hr) IV Continuous <Continuous>  ticagrelor 90 milliGRAM(s) Oral two times a day    MEDICATIONS  (PRN):  heparin  Injectable 4400 Unit(s) IV Push every 6 hours PRN For aPTT less than 40      ALLERGIES:  Allergies    No Known Allergies    Intolerances        LABS:                        16.2   8.8   )-----------( 310      ( 10 Aug 2018 07:38 )             50.4     08-10    139  |  101  |  8   ----------------------------<  101<H>  4.3   |  26  |  0.77    Ca    9.7      10 Aug 2018 07:37  Phos  3.0     08-10  Mg     2.2     08-10    TPro  7.9  /  Alb  3.8  /  TBili  0.3  /  DBili  x   /  AST  38<H>  /  ALT  47<H>  /  AlkPhos  83  08-09  · Assessment      53M with PMH of Anxiety, SocHX of Tobacco use 1ppd, occasional Marijuana use, and Past Heroine use (snorting modality in the 1980s) who presented to [facility] with chest pain for 6 week duration that has both increased in duration and frequency found to have increased Trop/CKMb, EKG showing ST depressions in V5/V6 transferred from [facility] for NSTEMI catheterization/ management.     Problem/Plan - 1:  ·  Problem: Non-ST elevation (NSTEMI) myocardial infarction.  Plan: Typical Chest pain, elevated trops & [facility] EKG v5/v6 depressions consistent with NSTEMI. Pt started on Heparin loading dose/Drip and transferred here for management. A1C 5.9; Lipid Panel: High Chol 203, Upper normal / UDP613;  -Cath for today 8/10   -Heparin drip, ASA, Metoprolol tart 12.5 BID, Brilinta 90mg BID, Atorvastatin 40 qd.  -Trop pending peak   -PTT q4-6hr   -Pending TSH   -Pending Utox- although pt denies cocaine use   - Start ACEI/ARB before discharge.    Problem/Plan - 2:  ·  Problem: Prophylactic measure.  Plan: heparin gtt.     Problem/Plan - 3:  ·  Problem: Nutrition, metabolism, and development symptoms.  Plan: F: pre-cath 1L NS fluids   E: replete PRN  N: NPO for procedure/DASH diet.     Problem/Plan - 4:  ·  Problem: Transition of care performed with sharing of clinical summary.  Plan: 1) PCP Contacted on Admission: (Y/N) --> No. Name & Phone #:  Roni Anderson [phone]  2) Date of Contact with PCP:  3) PCP Contacted at Discharge: (Y/N)  4) Summary of Handoff Given to PCP:   5) Post-Discharge Appointment Date and Location:

## 2018-08-10 NOTE — PROGRESS NOTE ADULT - SUBJECTIVE AND OBJECTIVE BOX
OVERNIGHT EVENTS: NAEO    SUBJECTIVE / INTERVAL HPI: Patient seen and examined at bedside. Since medical management, pt denies any chest pain, nausea, vomit, diaphoresis, SOB, or paresthesias. Pt endorses PMH of Anxiety, Tobacco use 1ppd, occasional Marijuana use, and Past Heroine use (snorting modality in the 1980s). but denies any past cardiac history or procedures.      VITAL SIGNS:  Vital Signs Last 24 Hrs  T(C): 36.8 (10 Aug 2018 09:24), Max: 36.8 (09 Aug 2018 17:43)  T(F): 98.2 (10 Aug 2018 09:24), Max: 98.2 (09 Aug 2018 17:43)  HR: 74 (10 Aug 2018 08:54) (70 - 90)  BP: 122/94 (10 Aug 2018 08:54) (118/94 - 158/95)  BP(mean): 110 (10 Aug 2018 08:54) (103 - 123)  RR: 18 (10 Aug 2018 08:54) (16 - 20)  SpO2: 99% (10 Aug 2018 08:54) (97% - 100%)    PHYSICAL EXAM:    General:  male in NAD   HEENT: NC/AT, anicteric sclera; MMM  Neck: supple no JVD   Cardiovascular: +S1/S2, RRR  Respiratory: CTA B/L;   Gastrointestinal: soft, NT/ND; +BSx4  Extremities: WWP; no edema  Vascular: 2+ radial, DP/PT pulses B/L  Neurological: AAOx3; no focal deficits    MEDICATIONS:  MEDICATIONS  (STANDING):  aspirin enteric coated 81 milliGRAM(s) Oral daily  atorvastatin 40 milliGRAM(s) Oral at bedtime  heparin  Infusion 750 Unit(s)/Hr (7.5 mL/Hr) IV Continuous <Continuous>  metoprolol tartrate 12.5 milliGRAM(s) Oral two times a day  nicotine - 21 mG/24Hr(s) Patch 1 patch Transdermal daily  sodium chloride 0.9%. 1000 milliLiter(s) (100 mL/Hr) IV Continuous <Continuous>  ticagrelor 90 milliGRAM(s) Oral two times a day    MEDICATIONS  (PRN):  heparin  Injectable 4400 Unit(s) IV Push every 6 hours PRN For aPTT less than 40      ALLERGIES:  Allergies    No Known Allergies    Intolerances        LABS:                        16.2   8.8   )-----------( 310      ( 10 Aug 2018 07:38 )             50.4     08-10    139  |  101  |  8   ----------------------------<  101<H>  4.3   |  26  |  0.77    Ca    9.7      10 Aug 2018 07:37  Phos  3.0     08-10  Mg     2.2     08-10    TPro  7.9  /  Alb  3.8  /  TBili  0.3  /  DBili  x   /  AST  38<H>  /  ALT  47<H>  /  AlkPhos  83  08-09    PT/INR - ( 10 Aug 2018 07:38 )   PT: 11.5 sec;   INR: 1.04          PTT - ( 10 Aug 2018 07:38 )  PTT:36.7 sec    CAPILLARY BLOOD GLUCOSE    CXR- pending read

## 2018-08-10 NOTE — DISCHARGE NOTE ADULT - PLAN OF CARE
to prevent future episode You came in with typical anginal chest pain that increased in frequency and duration. You were found to have elevation cardiac enzymes as well as EKG changes consistent with a NSTEMI (heart attack). We started you on medications to decrease further plaque formation and you received a PCI w/ 1 stent placement.   -Follow up with Cardiology Dr. Alexis 8/16/18 4pm and your PCP Dr. Roni Anderson on 8/13/18 so you can be followed in respect to the medications we have prescribed for you as well as preventing reoccurrence.   -We spoke about smoking cessation, and you were agreeable and preferred to start without Nicotine assistance. If you begin to have cravings/start smoking again, contact your PCP for nicotine patch and gum treatment. As previously discussed, smoking increases your risk for future heart attacks, therefore quitting would be a great advancement for your health. In addition, exercise will allopw   -Please follow a low fat/cholesterol diet to prevent future plaque formation.   -Medications: ASA1/day & Brilinta 90 2x/day: Dual antiplatelet therapy required to prevent re-stenosis.  -[id number] daily to ensure cholesterol remains at a low level   -Lopressor 12.5 2x/day You came in with typical anginal chest pain that increased in frequency and duration. You were found to have elevation cardiac enzymes as well as EKG changes consistent with a NSTEMI (heart attack). We started you on medications to decrease further plaque formation and you received a PCI w/ 1 stent placement.   -Follow up with Cardiology Dr. Alexis 8/16/18 4pm and your PCP Dr. Roni Anderson on 8/13/18 so you can be followed in respect to the medications we have prescribed for you as well as preventing reoccurrence.   -We spoke about smoking cessation, and you were agreeable and preferred to start without Nicotine assistance. If you begin to have cravings/start smoking again, contact your PCP for nicotine patch and gum treatment. As previously discussed, smoking increases your risk for future heart attacks, therefore quitting would be a great advancement for your health.    -Please follow a low fat/cholesterol diet to prevent future plaque formation.   -Medications: ASA1/day & Brilinta 90 2x/day: Dual antiplatelet therapy required for 1 year to prevent re-stenosis.  -[id number] daily to ensure cholesterol remains at a low level   -Toprol 25 daily

## 2018-08-10 NOTE — PROGRESS NOTE ADULT - PROBLEM SELECTOR PLAN 4
1) PCP Contacted on Admission: (Y/N) --> No. Name & Phone #:  Roni Anderson [phone]  2) Date of Contact with PCP:  3) PCP Contacted at Discharge: (Y/N)  4) Summary of Handoff Given to PCP:   5) Post-Discharge Appointment Date and Location:

## 2018-08-10 NOTE — PROGRESS NOTE ADULT - ASSESSMENT
53M smoker with distant history of asthma, no other PMH, presented to [facility] with CP R/I NSTEMI and trasnferred to [facility] for cardiac work-up to include cardiac catheterization with possible intervention.    -NPO for cath today with Dr Key  -Continue ASA, Brilinta, BB, Statin  -IVF hydration    Risks & benefits of procedure and alternative therapy have been explained to the patient including but not limited to: allergic reaction, bleeding w/possible need for blood transfusion, infection, renal and vascular compromise, limb damage, arrhythmia, stroke, vessel dissection/perforation, Myocardial infarction, emergent CABG. Informed consent obtained and in chart.

## 2018-08-11 VITALS
DIASTOLIC BLOOD PRESSURE: 74 MMHG | HEART RATE: 81 BPM | RESPIRATION RATE: 18 BRPM | OXYGEN SATURATION: 97 % | SYSTOLIC BLOOD PRESSURE: 107 MMHG

## 2018-08-11 LAB
ANION GAP SERPL CALC-SCNC: 11 MMOL/L — SIGNIFICANT CHANGE UP (ref 5–17)
BUN SERPL-MCNC: 16 MG/DL — SIGNIFICANT CHANGE UP (ref 7–23)
CALCIUM SERPL-MCNC: 9.2 MG/DL — SIGNIFICANT CHANGE UP (ref 8.4–10.5)
CHLORIDE SERPL-SCNC: 101 MMOL/L — SIGNIFICANT CHANGE UP (ref 96–108)
CO2 SERPL-SCNC: 26 MMOL/L — SIGNIFICANT CHANGE UP (ref 22–31)
CREAT SERPL-MCNC: 0.82 MG/DL — SIGNIFICANT CHANGE UP (ref 0.5–1.3)
GLUCOSE SERPL-MCNC: 99 MG/DL — SIGNIFICANT CHANGE UP (ref 70–99)
HCT VFR BLD CALC: 48.3 % — SIGNIFICANT CHANGE UP (ref 39–50)
HGB BLD-MCNC: 16.2 G/DL — SIGNIFICANT CHANGE UP (ref 13–17)
MAGNESIUM SERPL-MCNC: 2.2 MG/DL — SIGNIFICANT CHANGE UP (ref 1.6–2.6)
MCHC RBC-ENTMCNC: 28.5 PG — SIGNIFICANT CHANGE UP (ref 27–34)
MCHC RBC-ENTMCNC: 33.5 G/DL — SIGNIFICANT CHANGE UP (ref 32–36)
MCV RBC AUTO: 85 FL — SIGNIFICANT CHANGE UP (ref 80–100)
PLATELET # BLD AUTO: 292 K/UL — SIGNIFICANT CHANGE UP (ref 150–400)
POTASSIUM SERPL-MCNC: 4.3 MMOL/L — SIGNIFICANT CHANGE UP (ref 3.5–5.3)
POTASSIUM SERPL-SCNC: 4.3 MMOL/L — SIGNIFICANT CHANGE UP (ref 3.5–5.3)
RBC # BLD: 5.68 M/UL — SIGNIFICANT CHANGE UP (ref 4.2–5.8)
RBC # FLD: 14 % — SIGNIFICANT CHANGE UP (ref 10.3–16.9)
SODIUM SERPL-SCNC: 138 MMOL/L — SIGNIFICANT CHANGE UP (ref 135–145)
WBC # BLD: 9.4 K/UL — SIGNIFICANT CHANGE UP (ref 3.8–10.5)
WBC # FLD AUTO: 9.4 K/UL — SIGNIFICANT CHANGE UP (ref 3.8–10.5)

## 2018-08-11 PROCEDURE — C1874: CPT

## 2018-08-11 PROCEDURE — 96374 THER/PROPH/DIAG INJ IV PUSH: CPT

## 2018-08-11 PROCEDURE — 99239 HOSP IP/OBS DSCHRG MGMT >30: CPT

## 2018-08-11 PROCEDURE — 93306 TTE W/DOPPLER COMPLETE: CPT

## 2018-08-11 PROCEDURE — 71045 X-RAY EXAM CHEST 1 VIEW: CPT

## 2018-08-11 PROCEDURE — 84484 ASSAY OF TROPONIN QUANT: CPT

## 2018-08-11 PROCEDURE — 36415 COLL VENOUS BLD VENIPUNCTURE: CPT

## 2018-08-11 PROCEDURE — C1725: CPT

## 2018-08-11 PROCEDURE — 82550 ASSAY OF CK (CPK): CPT

## 2018-08-11 PROCEDURE — 85610 PROTHROMBIN TIME: CPT

## 2018-08-11 PROCEDURE — 84443 ASSAY THYROID STIM HORMONE: CPT

## 2018-08-11 PROCEDURE — 83735 ASSAY OF MAGNESIUM: CPT

## 2018-08-11 PROCEDURE — 83036 HEMOGLOBIN GLYCOSYLATED A1C: CPT

## 2018-08-11 PROCEDURE — C1887: CPT

## 2018-08-11 PROCEDURE — G0378: CPT

## 2018-08-11 PROCEDURE — 99231 SBSQ HOSP IP/OBS SF/LOW 25: CPT | Mod: 25

## 2018-08-11 PROCEDURE — 71046 X-RAY EXAM CHEST 2 VIEWS: CPT

## 2018-08-11 PROCEDURE — 80061 LIPID PANEL: CPT

## 2018-08-11 PROCEDURE — 80053 COMPREHEN METABOLIC PANEL: CPT

## 2018-08-11 PROCEDURE — 85027 COMPLETE CBC AUTOMATED: CPT

## 2018-08-11 PROCEDURE — 93005 ELECTROCARDIOGRAM TRACING: CPT

## 2018-08-11 PROCEDURE — 99285 EMERGENCY DEPT VISIT HI MDM: CPT | Mod: 25

## 2018-08-11 PROCEDURE — 80307 DRUG TEST PRSMV CHEM ANLYZR: CPT

## 2018-08-11 PROCEDURE — C1769: CPT

## 2018-08-11 PROCEDURE — 85025 COMPLETE CBC W/AUTO DIFF WBC: CPT

## 2018-08-11 PROCEDURE — 85730 THROMBOPLASTIN TIME PARTIAL: CPT

## 2018-08-11 PROCEDURE — 84100 ASSAY OF PHOSPHORUS: CPT

## 2018-08-11 PROCEDURE — C1894: CPT

## 2018-08-11 PROCEDURE — 80048 BASIC METABOLIC PNL TOTAL CA: CPT

## 2018-08-11 PROCEDURE — 82553 CREATINE MB FRACTION: CPT

## 2018-08-11 RX ORDER — HEPARIN SODIUM 5000 [USP'U]/ML
5000 INJECTION INTRAVENOUS; SUBCUTANEOUS EVERY 8 HOURS
Qty: 0 | Refills: 0 | Status: DISCONTINUED | OUTPATIENT
Start: 2018-08-11 | End: 2018-08-11

## 2018-08-11 RX ORDER — ATORVASTATIN CALCIUM 80 MG/1
1 TABLET, FILM COATED ORAL
Qty: 30 | Refills: 0
Start: 2018-08-11 | End: 2018-09-09

## 2018-08-11 RX ORDER — TICAGRELOR 90 MG/1
1 TABLET ORAL
Qty: 60 | Refills: 0
Start: 2018-08-11 | End: 2018-09-09

## 2018-08-11 RX ORDER — METOPROLOL TARTRATE 50 MG
1 TABLET ORAL
Qty: 30 | Refills: 0
Start: 2018-08-11 | End: 2018-09-09

## 2018-08-11 RX ORDER — ASPIRIN/CALCIUM CARB/MAGNESIUM 324 MG
1 TABLET ORAL
Qty: 30 | Refills: 0
Start: 2018-08-11 | End: 2018-09-09

## 2018-08-11 RX ADMIN — TICAGRELOR 90 MILLIGRAM(S): 90 TABLET ORAL at 06:02

## 2018-08-11 RX ADMIN — Medication 12.5 MILLIGRAM(S): at 10:28

## 2018-08-11 RX ADMIN — Medication 81 MILLIGRAM(S): at 10:29

## 2018-08-11 NOTE — PROGRESS NOTE ADULT - SUBJECTIVE AND OBJECTIVE BOX
INTERVENTIONAL CARDIOLOGY FOLLOW UP NOTE    -Patient seen and examined this am  -No events overnight  -No complaints this am    VASCULAR ACCESS EXAM:     RADIAL:   2+ right radial pulse   Normal capillary refill. No evidence of motor or sensory deficits of digits, hand, wrist or forearm.   -Access site clean, non-tender, without ecchymosis or hematoma.    A/P  54 yo M with PMH smoker now s/p PCI of proximal LCX with VENKATA via radial approach with no evidence of vascular complications post procedure.     -continue with current medications including dual antiplatelet therapy   -discharge instructions as per protocol   -outpatient follow up with primary cardiologist

## 2018-08-16 ENCOUNTER — APPOINTMENT (OUTPATIENT)
Dept: HEART AND VASCULAR | Facility: CLINIC | Age: 53
End: 2018-08-16
Payer: COMMERCIAL

## 2018-08-16 VITALS
TEMPERATURE: 97.2 F | HEART RATE: 81 BPM | OXYGEN SATURATION: 98 % | BODY MASS INDEX: 23.13 KG/M2 | WEIGHT: 157.98 LBS | SYSTOLIC BLOOD PRESSURE: 123 MMHG | DIASTOLIC BLOOD PRESSURE: 80 MMHG | HEIGHT: 69.29 IN

## 2018-08-16 DIAGNOSIS — I21.4 NON-ST ELEVATION (NSTEMI) MYOCARDIAL INFARCTION: ICD-10-CM

## 2018-08-16 DIAGNOSIS — I25.110 ATHEROSCLEROTIC HEART DISEASE OF NATIVE CORONARY ARTERY WITH UNSTABLE ANGINA PECTORIS: ICD-10-CM

## 2018-08-16 DIAGNOSIS — Z87.81 PERSONAL HISTORY OF (HEALED) TRAUMATIC FRACTURE: ICD-10-CM

## 2018-08-16 DIAGNOSIS — Z87.898 PERSONAL HISTORY OF OTHER SPECIFIED CONDITIONS: ICD-10-CM

## 2018-08-16 DIAGNOSIS — Z82.49 FAMILY HISTORY OF ISCHEMIC HEART DISEASE AND OTHER DISEASES OF THE CIRCULATORY SYSTEM: ICD-10-CM

## 2018-08-16 DIAGNOSIS — F12.90 CANNABIS USE, UNSPECIFIED, UNCOMPLICATED: ICD-10-CM

## 2018-08-16 DIAGNOSIS — F17.210 NICOTINE DEPENDENCE, CIGARETTES, UNCOMPLICATED: ICD-10-CM

## 2018-08-16 DIAGNOSIS — Z28.89 IMMUNIZATION NOT CARRIED OUT FOR OTHER REASON: ICD-10-CM

## 2018-08-16 PROCEDURE — 93000 ELECTROCARDIOGRAM COMPLETE: CPT

## 2018-08-16 PROCEDURE — 99401 PREV MED CNSL INDIV APPRX 15: CPT | Mod: 25

## 2018-08-16 PROCEDURE — 99215 OFFICE O/P EST HI 40 MIN: CPT

## 2018-08-16 PROCEDURE — 99406 BEHAV CHNG SMOKING 3-10 MIN: CPT | Mod: 25

## 2018-09-05 ENCOUNTER — OTHER (OUTPATIENT)
Age: 53
End: 2018-09-05

## 2018-09-06 ENCOUNTER — RX RENEWAL (OUTPATIENT)
Age: 53
End: 2018-09-06

## 2018-09-06 ENCOUNTER — OTHER (OUTPATIENT)
Age: 53
End: 2018-09-06

## 2018-11-12 ENCOUNTER — APPOINTMENT (OUTPATIENT)
Dept: HEART AND VASCULAR | Facility: CLINIC | Age: 53
End: 2018-11-12
Payer: COMMERCIAL

## 2018-11-12 VITALS
BODY MASS INDEX: 23.43 KG/M2 | HEART RATE: 79 BPM | WEIGHT: 159.99 LBS | SYSTOLIC BLOOD PRESSURE: 110 MMHG | TEMPERATURE: 97.9 F | OXYGEN SATURATION: 99 % | DIASTOLIC BLOOD PRESSURE: 84 MMHG | HEIGHT: 69.29 IN

## 2018-11-12 DIAGNOSIS — Z78.9 OTHER SPECIFIED HEALTH STATUS: ICD-10-CM

## 2018-11-12 PROCEDURE — 93000 ELECTROCARDIOGRAM COMPLETE: CPT

## 2018-11-12 PROCEDURE — 99215 OFFICE O/P EST HI 40 MIN: CPT

## 2018-11-12 PROCEDURE — 99406 BEHAV CHNG SMOKING 3-10 MIN: CPT | Mod: 25

## 2018-11-12 PROCEDURE — 99401 PREV MED CNSL INDIV APPRX 15: CPT | Mod: 25

## 2018-11-12 NOTE — HISTORY OF PRESENT ILLNESS
[FreeTextEntry1] : 53M with PMH of Anxiety, SocHX of Tobacco use 1ppd, occasional Marijuana use, \par and Past Heroine use (snorting modality in the 1980s) who presented to [facility] on \par 8/9/18 with chest pain for 6 week duration that had both increased in duration \par and frequency, found to have increased Trop/CKMb, EKG showing ST depressions in \par V5/V6 transferred from [facility] for NSTEMI catheterization/ management along with \par Heparin loading dose/heparin drip. Patient here was started on ASA, Brilinta, \par Lipitor, and Lopressor. 8/10 Interventional cath was done: VENKATA placed in pLCX \par Proximal RCA 30%. LM and LAD normal. Radial band removed w/o evidence of \par hematoma. Echo followed showing EF 65% with normal LV wall thickness, movement, \par and size. TSHnL, A1C 5.9; Lipid Panel: High Chol 203, Upper normal / \par [id number].

## 2018-11-12 NOTE — DISCUSSION/SUMMARY
[FreeTextEntry1] : The number of diagnostic and/or management options \par CAD -  8/10 Interventional cath was done: VENKATA placed in pLCX \par Proximal RCA 30%. LM and LAD normal. Radial band removed w/o evidence of \par hematoma. Echo followed showing EF 65% with normal LV wall thickness, movement, \par and size. \par DAPT BB Statin all toelrating well  EF preserved\par \par HPL - TSHnL, A1C 5.9; Lipid Panel: High Chol 203, Upper normal / \par [id number]. \par \par \par Labs, radiology:\par \par Discussion of the case with another healthcare provider: pcp\par \par Overall Risk: High\par \par 15min additional detailed discussion regarding prevention including but not limiting to goal SBP<130mmHg, Mediterranean diet discussion, No salt diet, diabetes screening, and goal LDL<100. Smoking cessation, EtOH use and depression screen where applicable\par Exercise counseling and plan discussed with patient\par will readdress and reinforce prevention in subsequent visits \par \par Smoking and tobacco use cessation counseling visit is greater than three minutes, but not more than 10 minutes.\par Patient was competent and alert at the time of the counseling provided. Will reinforce subsequent visit.\par ·	The patient’s tobacco use - less than 1 ppd\par ·	Advised to quit and impact of smoking - including increased CAD PAD and mortality \par ·	Assessed willingness to attempt to quit - pre-contemplation stage\par ·	Providing methods and skills for cessation - nicotine replacement, cbt therapy\par ·	Medication management of smoking session drugs - n/a\par ·	Resources provided - group counseling, individual counseling, cbt therapy\par ·	Setting quit date - not ready to commit to a date              \par ·	Follow-up arranged - next scheduled visit\par

## 2018-11-12 NOTE — PHYSICAL EXAM
[General Appearance - Well Developed] : well developed [Normal Appearance] : normal appearance [Well Groomed] : well groomed [General Appearance - Well Nourished] : well nourished [No Deformities] : no deformities [General Appearance - In No Acute Distress] : no acute distress [Normal Conjunctiva] : the conjunctiva exhibited no abnormalities [Eyelids - No Xanthelasma] : the eyelids demonstrated no xanthelasmas [Normal Oral Mucosa] : normal oral mucosa [No Oral Pallor] : no oral pallor [No Oral Cyanosis] : no oral cyanosis [Normal Jugular Venous A Waves Present] : normal jugular venous A waves present [Normal Jugular Venous V Waves Present] : normal jugular venous V waves present [No Jugular Venous Mccallum A Waves] : no jugular venous mccallum A waves [Respiration, Rhythm And Depth] : normal respiratory rhythm and effort [Exaggerated Use Of Accessory Muscles For Inspiration] : no accessory muscle use [Auscultation Breath Sounds / Voice Sounds] : lungs were clear to auscultation bilaterally [Heart Rate And Rhythm] : heart rate and rhythm were normal [Heart Sounds] : normal S1 and S2 [Murmurs] : no murmurs present [Abdomen Soft] : soft [Abdomen Tenderness] : non-tender [Abdomen Mass (___ Cm)] : no abdominal mass palpated [Abnormal Walk] : normal gait [Gait - Sufficient For Exercise Testing] : the gait was sufficient for exercise testing [Nail Clubbing] : no clubbing of the fingernails [Cyanosis, Localized] : no localized cyanosis [Petechial Hemorrhages (___cm)] : no petechial hemorrhages [Skin Color & Pigmentation] : normal skin color and pigmentation [] : no rash [No Venous Stasis] : no venous stasis [Skin Lesions] : no skin lesions [No Skin Ulcers] : no skin ulcer [No Xanthoma] : no  xanthoma was observed [Oriented To Time, Place, And Person] : oriented to person, place, and time [Affect] : the affect was normal [Mood] : the mood was normal [No Anxiety] : not feeling anxious

## 2018-12-03 ENCOUNTER — RX RENEWAL (OUTPATIENT)
Age: 53
End: 2018-12-03

## 2019-01-03 ENCOUNTER — RX RENEWAL (OUTPATIENT)
Age: 54
End: 2019-01-03

## 2019-02-11 ENCOUNTER — APPOINTMENT (OUTPATIENT)
Dept: HEART AND VASCULAR | Facility: CLINIC | Age: 54
End: 2019-02-11
Payer: COMMERCIAL

## 2019-02-11 VITALS
BODY MASS INDEX: 23.76 KG/M2 | SYSTOLIC BLOOD PRESSURE: 128 MMHG | DIASTOLIC BLOOD PRESSURE: 82 MMHG | OXYGEN SATURATION: 98 % | TEMPERATURE: 97.8 F | HEART RATE: 73 BPM | HEIGHT: 70 IN | WEIGHT: 165.99 LBS

## 2019-02-11 PROCEDURE — 99214 OFFICE O/P EST MOD 30 MIN: CPT

## 2019-02-11 PROCEDURE — 93000 ELECTROCARDIOGRAM COMPLETE: CPT

## 2019-02-11 NOTE — HISTORY OF PRESENT ILLNESS
[FreeTextEntry1] : 53M with PMH of Anxiety, SocHX of Tobacco use 1ppd, occasional Marijuana use, \par and Past Heroine use (snorting modality in the 1980s) who presented to [facility] on \par 8/9/18 with chest pain for 6 week duration that had both increased in duration \par and frequency, found to have increased Trop/CKMb, EKG showing ST depressions in \par V5/V6 transferred from [facility] for NSTEMI catheterization/ management along with \par Heparin loading dose/heparin drip. Patient here was started on ASA, Brilinta, \par Lipitor, and Lopressor. 8/10 Interventional cath was done: VENKATA placed in pLCX \par Proximal RCA 30%. LM and LAD normal. Radial band removed w/o evidence of \par hematoma. Echo followed showing EF 65% with normal LV wall thickness, movement, \par and size. TSHnL, A1C 5.9; Lipid Panel: High Chol 203, Upper normal / \par [id number]. \par \par 2/11/19 USOH, 100% compliant with meds\par location: chest\par duration: na\par  modifying factors: na\par timing: na\par severity: 0/10\par EKG unchanged from prior (in chart)\par consultation notes reviewed where appropriate\par

## 2019-02-11 NOTE — DISCUSSION/SUMMARY
[FreeTextEntry1] : The number of diagnostic and/or management options \par CAD - 8/10 Interventional cath was done: VENKATA placed in pLCX \par Proximal RCA 30%. LM and LAD normal. Radial band removed w/o evidence of \par hematoma. Echo followed showing EF 65% with normal LV wall thickness, movement, \par and size. \par DAPT BB Statin all toelrating well EF preserved\par \par HPL - TSHnL, A1C 5.9; Lipid Panel: High Chol 203, Upper normal / \par [id number]. \par \par \par \par Labs, radiology: ekg cath\par \par Aspirin therapy: yes\par \par LDL: statin\par \par \par \par Overall Risk: High Complexity\par \par \par Additional detailed discussion regarding prevention including but not limiting to goal SBP<130mmHg, Mediterranean diet discussion, No salt diet, diabetes screening, and goal LDL<100. Smoking cessation, EtOH use and depression screen where applicable\par Exercise counseling and plan discussed with patient\par will readdress and reinforce prevention in subsequent visits \par \par Medical team conference with interdisciplinary team of health care professionals, patient and/or family not present, 34 minutes, discussion of the case with another healthcare provider: Sally

## 2019-03-18 ENCOUNTER — MEDICATION RENEWAL (OUTPATIENT)
Age: 54
End: 2019-03-18

## 2019-04-29 ENCOUNTER — MOBILE ON CALL (OUTPATIENT)
Age: 54
End: 2019-04-29

## 2019-08-12 ENCOUNTER — APPOINTMENT (OUTPATIENT)
Dept: HEART AND VASCULAR | Facility: CLINIC | Age: 54
End: 2019-08-12
Payer: COMMERCIAL

## 2019-08-12 VITALS
WEIGHT: 170 LBS | DIASTOLIC BLOOD PRESSURE: 80 MMHG | HEART RATE: 88 BPM | BODY MASS INDEX: 24.61 KG/M2 | TEMPERATURE: 97.9 F | HEIGHT: 69.69 IN | OXYGEN SATURATION: 98 % | SYSTOLIC BLOOD PRESSURE: 130 MMHG

## 2019-08-12 PROCEDURE — 99401 PREV MED CNSL INDIV APPRX 15: CPT

## 2019-08-12 PROCEDURE — 99214 OFFICE O/P EST MOD 30 MIN: CPT | Mod: 25

## 2019-08-12 PROCEDURE — 93000 ELECTROCARDIOGRAM COMPLETE: CPT

## 2019-08-12 RX ORDER — CLOPIDOGREL BISULFATE 75 MG/1
75 TABLET, FILM COATED ORAL DAILY
Qty: 90 | Refills: 3 | Status: COMPLETED | COMMUNITY
Start: 2019-04-29 | End: 2019-08-12

## 2019-08-12 RX ORDER — TICAGRELOR 90 MG/1
90 TABLET ORAL TWICE DAILY
Qty: 180 | Refills: 3 | Status: COMPLETED | COMMUNITY
End: 2019-08-12

## 2019-08-12 NOTE — DISCUSSION/SUMMARY
[FreeTextEntry1] : The number of diagnostic and/or management options \par CAD - 8/10/2018 Interventional cath was done: VENKATA placed in pLCX \par Proximal RCA 30%. LM and LAD normal. Radial band removed w/o evidence of \par hematoma. Echo followed showing EF 65% with normal LV wall thickness, movement, \par and size. \par DAPT BB Statin all tolerating well EF preserved\par can stop brilinta\par repeat echo \par HPL - TSHnL, A1C 5.9; Lipid Panel: High Chol 203, Upper normal / \par [id number]. \par \par \par \par Labs, radiology: ekg cath\par \par Aspirin therapy: yes\par \par LDL: statin\par \par \par \par Overall Risk: High Complexity\par \par Cardiovascular Prevention counseling (16min)\par ·	Advised SBP guidelines based on ACC/AHA and SPRINT trial increased CAD PAD and mortality \par ·	Assessed willingness to attempt - contemplation stage\par ·	Agree to no added salt and added sugar diet  - prevention medicine referral, nutritionist\par ·	Assist with resources provided - prevention medicine referral, nutritionist, individual counseling\par ·	Arrange ·	Follow-up arranged - next scheduled visit

## 2019-08-12 NOTE — HISTORY OF PRESENT ILLNESS
[FreeTextEntry1] : 53M with PMH of Anxiety, SocHX of Tobacco use 1ppd, occasional Marijuana use, \par and Past Heroine use (snorting modality in the 1980s) who presented to [facility] on \par 8/9/18 with chest pain for 6 week duration that had both increased in duration \par and frequency, found to have increased Trop/CKMb, EKG showing ST depressions in \par V5/V6 transferred from [facility] for NSTEMI catheterization/ management along with \par Heparin loading dose/heparin drip. Patient here was started on ASA, Brilinta, \par Lipitor, and Lopressor. 8/10 Interventional cath was done: VENKATA placed in pLCX \par Proximal RCA 30%. LM and LAD normal. Radial band removed w/o evidence of \par hematoma. Echo followed showing EF 65% with normal LV wall thickness, movement, \par and size. TSHnL, A1C 5.9; Lipid Panel: High Chol 203, Upper normal / \par [id number]. \par \par 8/12/19 USOH, 100% compliant with meds\par location: chest\par duration: na\par  modifying factors: na\par timing: na\par severity: 0/10\par EKG unchanged from prior (in chart)\par consultation notes reviewed where appropriate\par

## 2019-08-12 NOTE — PHYSICAL EXAM
[Normal Appearance] : normal appearance [General Appearance - Well Developed] : well developed [Well Groomed] : well groomed [General Appearance - Well Nourished] : well nourished [General Appearance - In No Acute Distress] : no acute distress [No Deformities] : no deformities [Normal Conjunctiva] : the conjunctiva exhibited no abnormalities [Normal Oral Mucosa] : normal oral mucosa [Eyelids - No Xanthelasma] : the eyelids demonstrated no xanthelasmas [No Oral Pallor] : no oral pallor [No Oral Cyanosis] : no oral cyanosis [Normal Jugular Venous A Waves Present] : normal jugular venous A waves present [Normal Jugular Venous V Waves Present] : normal jugular venous V waves present [No Jugular Venous Mccallum A Waves] : no jugular venous mccallum A waves [Exaggerated Use Of Accessory Muscles For Inspiration] : no accessory muscle use [Respiration, Rhythm And Depth] : normal respiratory rhythm and effort [Auscultation Breath Sounds / Voice Sounds] : lungs were clear to auscultation bilaterally [Heart Rate And Rhythm] : heart rate and rhythm were normal [Heart Sounds] : normal S1 and S2 [Murmurs] : no murmurs present [Abdomen Tenderness] : non-tender [Abdomen Soft] : soft [Abdomen Mass (___ Cm)] : no abdominal mass palpated [Gait - Sufficient For Exercise Testing] : the gait was sufficient for exercise testing [Abnormal Walk] : normal gait [Nail Clubbing] : no clubbing of the fingernails [Cyanosis, Localized] : no localized cyanosis [Skin Color & Pigmentation] : normal skin color and pigmentation [Petechial Hemorrhages (___cm)] : no petechial hemorrhages [] : no rash [No Venous Stasis] : no venous stasis [Skin Lesions] : no skin lesions [No Skin Ulcers] : no skin ulcer [No Xanthoma] : no  xanthoma was observed [Oriented To Time, Place, And Person] : oriented to person, place, and time [Affect] : the affect was normal [Mood] : the mood was normal [No Anxiety] : not feeling anxious

## 2019-08-21 NOTE — H&P ADULT - NSHPPHYSICALEXAM_GEN_ALL_CORE
no
.  VITAL SIGNS:  T(C): 36.2 (08-09-18 @ 19:41), Max: 36.8 (08-09-18 @ 17:43)  T(F): 97.1 (08-09-18 @ 19:41), Max: 98.2 (08-09-18 @ 17:43)  HR: 80 (08-09-18 @ 19:42) (78 - 90)  BP: 146/93 (08-09-18 @ 19:42) (146/93 - 154/106)  BP(mean): 111 (08-09-18 @ 19:42) (111 - 123)  RR: 16 (08-09-18 @ 19:42) (16 - 20)  SpO2: 100% (08-09-18 @ 19:42) (97% - 100%)  Wt(kg): --    PHYSICAL EXAM:    Constitutional: pleasant male sitting up in bed, in NAD  Head: NC/AT  Eyes: EOMI, anicteric sclera  ENT: no nasal discharge; uvula midline, no oropharyngeal erythema or exudates; MMM  Neck: supple  Respiratory: CTA B/L; no W/R/R, no retractions  Cardiac: +S1/S2; RRR; no M/R/G; PMI non-displaced  Gastrointestinal: abdomen soft, NT/ND; no rebound or guarding; +BSx4  Extremities: WWP, no clubbing or cyanosis; no peripheral edema  Vascular: 2+ radial pulses B/L  Dermatologic: skin warm, dry and intact; no rashes, wounds, or scars  Neurologic: AAOx3; CNII-XII grossly intact; no focal deficits  Psychiatric: affect and characteristics of appearance, verbalizations, behaviors are appropriate

## 2019-09-09 ENCOUNTER — APPOINTMENT (OUTPATIENT)
Dept: HEART AND VASCULAR | Facility: CLINIC | Age: 54
End: 2019-09-09

## 2019-10-03 ENCOUNTER — APPOINTMENT (OUTPATIENT)
Dept: INTERNAL MEDICINE | Facility: CLINIC | Age: 54
End: 2019-10-03
Payer: COMMERCIAL

## 2019-10-03 VITALS
TEMPERATURE: 97.8 F | OXYGEN SATURATION: 97 % | SYSTOLIC BLOOD PRESSURE: 133 MMHG | BODY MASS INDEX: 25.63 KG/M2 | WEIGHT: 177 LBS | HEART RATE: 86 BPM | DIASTOLIC BLOOD PRESSURE: 92 MMHG

## 2019-10-03 DIAGNOSIS — Z82.49 FAMILY HISTORY OF ISCHEMIC HEART DISEASE AND OTHER DISEASES OF THE CIRCULATORY SYSTEM: ICD-10-CM

## 2019-10-03 DIAGNOSIS — Z87.891 PERSONAL HISTORY OF NICOTINE DEPENDENCE: ICD-10-CM

## 2019-10-03 DIAGNOSIS — Z82.5 FAMILY HISTORY OF ASTHMA AND OTHER CHRONIC LOWER RESPIRATORY DISEASES: ICD-10-CM

## 2019-10-03 DIAGNOSIS — Z87.898 PERSONAL HISTORY OF OTHER SPECIFIED CONDITIONS: ICD-10-CM

## 2019-10-03 DIAGNOSIS — Z78.9 OTHER SPECIFIED HEALTH STATUS: ICD-10-CM

## 2019-10-03 DIAGNOSIS — R30.0 DYSURIA: ICD-10-CM

## 2019-10-03 DIAGNOSIS — Z80.9 FAMILY HISTORY OF MALIGNANT NEOPLASM, UNSPECIFIED: ICD-10-CM

## 2019-10-03 DIAGNOSIS — Z12.2 ENCOUNTER FOR SCREENING FOR MALIGNANT NEOPLASM OF RESPIRATORY ORGANS: ICD-10-CM

## 2019-10-03 DIAGNOSIS — Z80.3 FAMILY HISTORY OF MALIGNANT NEOPLASM OF BREAST: ICD-10-CM

## 2019-10-03 PROCEDURE — 99386 PREV VISIT NEW AGE 40-64: CPT | Mod: 25

## 2019-10-03 PROCEDURE — 36415 COLL VENOUS BLD VENIPUNCTURE: CPT

## 2019-10-03 PROCEDURE — G0296 VISIT TO DETERM LDCT ELIG: CPT | Mod: 25

## 2019-10-04 LAB
ALBUMIN SERPL ELPH-MCNC: 4.5 G/DL
ALP BLD-CCNC: 83 U/L
ALT SERPL-CCNC: 30 U/L
ANION GAP SERPL CALC-SCNC: 14 MMOL/L
APPEARANCE: CLEAR
AST SERPL-CCNC: 20 U/L
BASOPHILS # BLD AUTO: 0.04 K/UL
BASOPHILS NFR BLD AUTO: 0.7 %
BILIRUB SERPL-MCNC: 0.3 MG/DL
BILIRUBIN URINE: NEGATIVE
BLOOD URINE: NEGATIVE
BUN SERPL-MCNC: 18 MG/DL
C TRACH RRNA SPEC QL NAA+PROBE: NOT DETECTED
CALCIUM SERPL-MCNC: 9.8 MG/DL
CHLORIDE SERPL-SCNC: 101 MMOL/L
CHOLEST SERPL-MCNC: 124 MG/DL
CHOLEST/HDLC SERPL: 2.5 RATIO
CO2 SERPL-SCNC: 26 MMOL/L
COLOR: YELLOW
CREAT SERPL-MCNC: 0.75 MG/DL
CREAT SPEC-SCNC: 232 MG/DL
EOSINOPHIL # BLD AUTO: 0.22 K/UL
EOSINOPHIL NFR BLD AUTO: 3.7 %
ESTIMATED AVERAGE GLUCOSE: 131 MG/DL
GLUCOSE QUALITATIVE U: NEGATIVE
GLUCOSE SERPL-MCNC: 115 MG/DL
HBA1C MFR BLD HPLC: 6.2 %
HBV CORE IGG+IGM SER QL: REACTIVE
HBV SURFACE AB SER QL: REACTIVE
HBV SURFACE AG SER QL: NONREACTIVE
HCT VFR BLD CALC: 50.6 %
HCV AB SER QL: NONREACTIVE
HCV S/CO RATIO: 0.21 S/CO
HDLC SERPL-MCNC: 50 MG/DL
HEPATITIS A IGG ANTIBODY: NONREACTIVE
HGB BLD-MCNC: 15.3 G/DL
HIV1+2 AB SPEC QL IA.RAPID: NONREACTIVE
IMM GRANULOCYTES NFR BLD AUTO: 0.2 %
KETONES URINE: NEGATIVE
LDLC SERPL CALC-MCNC: 63 MG/DL
LEUKOCYTE ESTERASE URINE: NEGATIVE
LYMPHOCYTES # BLD AUTO: 2.11 K/UL
LYMPHOCYTES NFR BLD AUTO: 35.1 %
MAN DIFF?: NORMAL
MCHC RBC-ENTMCNC: 27.9 PG
MCHC RBC-ENTMCNC: 30.2 GM/DL
MCV RBC AUTO: 92.2 FL
MICROALBUMIN 24H UR DL<=1MG/L-MCNC: <1.2 MG/DL
MICROALBUMIN/CREAT 24H UR-RTO: NORMAL MG/G
MONOCYTES # BLD AUTO: 0.66 K/UL
MONOCYTES NFR BLD AUTO: 11 %
N GONORRHOEA RRNA SPEC QL NAA+PROBE: NOT DETECTED
NEUTROPHILS # BLD AUTO: 2.97 K/UL
NEUTROPHILS NFR BLD AUTO: 49.3 %
NITRITE URINE: NEGATIVE
PH URINE: 6
PLATELET # BLD AUTO: 293 K/UL
POTASSIUM SERPL-SCNC: 4.7 MMOL/L
PROT SERPL-MCNC: 7 G/DL
PROTEIN URINE: NORMAL
PSA SERPL-MCNC: 1.07 NG/ML
RBC # BLD: 5.49 M/UL
RBC # FLD: 14.1 %
SODIUM SERPL-SCNC: 141 MMOL/L
SOURCE AMPLIFICATION: NORMAL
SOURCE AMPLIFICATION: NORMAL
SPECIFIC GRAVITY URINE: 1.03
T PALLIDUM AB SER QL IA: NEGATIVE
T VAGINALIS RRNA SPEC QL NAA+PROBE: NOT DETECTED
TRIGL SERPL-MCNC: 56 MG/DL
TSH SERPL-ACNC: 3.11 UIU/ML
UROBILINOGEN URINE: NORMAL
WBC # FLD AUTO: 6.01 K/UL

## 2019-10-08 ENCOUNTER — LABORATORY RESULT (OUTPATIENT)
Age: 54
End: 2019-10-08

## 2019-10-08 ENCOUNTER — APPOINTMENT (OUTPATIENT)
Dept: DERMATOLOGY | Facility: CLINIC | Age: 54
End: 2019-10-08
Payer: COMMERCIAL

## 2019-10-08 VITALS — DIASTOLIC BLOOD PRESSURE: 84 MMHG | SYSTOLIC BLOOD PRESSURE: 117 MMHG

## 2019-10-08 DIAGNOSIS — L72.9 FOLLICULAR CYST OF THE SKIN AND SUBCUTANEOUS TISSUE, UNSPECIFIED: ICD-10-CM

## 2019-10-08 DIAGNOSIS — D48.9 NEOPLASM OF UNCERTAIN BEHAVIOR, UNSPECIFIED: ICD-10-CM

## 2019-10-08 DIAGNOSIS — Z91.89 OTHER SPECIFIED PERSONAL RISK FACTORS, NOT ELSEWHERE CLASSIFIED: ICD-10-CM

## 2019-10-08 DIAGNOSIS — Z12.83 ENCOUNTER FOR SCREENING FOR MALIGNANT NEOPLASM OF SKIN: ICD-10-CM

## 2019-10-08 PROCEDURE — 11900 INJECT SKIN LESIONS </W 7: CPT | Mod: 59

## 2019-10-08 PROCEDURE — 11102 TANGNTL BX SKIN SINGLE LES: CPT

## 2019-10-08 PROCEDURE — 99203 OFFICE O/P NEW LOW 30 MIN: CPT | Mod: 25

## 2019-11-05 ENCOUNTER — RX RENEWAL (OUTPATIENT)
Age: 54
End: 2019-11-05

## 2019-11-11 ENCOUNTER — APPOINTMENT (OUTPATIENT)
Dept: GASTROENTEROLOGY | Facility: CLINIC | Age: 54
End: 2019-11-11
Payer: COMMERCIAL

## 2019-11-11 VITALS
DIASTOLIC BLOOD PRESSURE: 87 MMHG | HEART RATE: 77 BPM | TEMPERATURE: 98.1 F | HEIGHT: 69.69 IN | WEIGHT: 172 LBS | BODY MASS INDEX: 24.9 KG/M2 | SYSTOLIC BLOOD PRESSURE: 120 MMHG | OXYGEN SATURATION: 97 %

## 2019-11-11 DIAGNOSIS — Z12.11 ENCOUNTER FOR SCREENING FOR MALIGNANT NEOPLASM OF COLON: ICD-10-CM

## 2019-11-11 PROCEDURE — 99204 OFFICE O/P NEW MOD 45 MIN: CPT

## 2019-12-23 ENCOUNTER — APPOINTMENT (OUTPATIENT)
Dept: INTERNAL MEDICINE | Facility: CLINIC | Age: 54
End: 2019-12-23

## 2020-01-14 ENCOUNTER — APPOINTMENT (OUTPATIENT)
Dept: GASTROENTEROLOGY | Facility: HOSPITAL | Age: 55
End: 2020-01-14

## 2020-01-14 ENCOUNTER — OUTPATIENT (OUTPATIENT)
Dept: OUTPATIENT SERVICES | Facility: HOSPITAL | Age: 55
LOS: 1 days | Discharge: HOME | End: 2020-01-14
Payer: COMMERCIAL

## 2020-01-14 ENCOUNTER — RESULT REVIEW (OUTPATIENT)
Age: 55
End: 2020-01-14

## 2020-01-14 PROCEDURE — 45380 COLONOSCOPY AND BIOPSY: CPT | Mod: PT

## 2020-01-14 PROCEDURE — 88305 TISSUE EXAM BY PATHOLOGIST: CPT

## 2020-01-14 PROCEDURE — 88305 TISSUE EXAM BY PATHOLOGIST: CPT | Mod: 26

## 2020-01-14 PROCEDURE — 45385 COLONOSCOPY W/LESION REMOVAL: CPT

## 2020-01-15 LAB — SURGICAL PATHOLOGY STUDY: SIGNIFICANT CHANGE UP

## 2020-02-24 ENCOUNTER — NON-APPOINTMENT (OUTPATIENT)
Age: 55
End: 2020-02-24

## 2020-02-24 ENCOUNTER — APPOINTMENT (OUTPATIENT)
Dept: HEART AND VASCULAR | Facility: CLINIC | Age: 55
End: 2020-02-24
Payer: COMMERCIAL

## 2020-02-24 VITALS
OXYGEN SATURATION: 97 % | HEART RATE: 78 BPM | WEIGHT: 170 LBS | HEIGHT: 68 IN | BODY MASS INDEX: 25.76 KG/M2 | SYSTOLIC BLOOD PRESSURE: 118 MMHG | DIASTOLIC BLOOD PRESSURE: 76 MMHG

## 2020-02-24 DIAGNOSIS — Z00.00 ENCOUNTER FOR GENERAL ADULT MEDICAL EXAMINATION W/OUT ABNORMAL FINDINGS: ICD-10-CM

## 2020-02-24 DIAGNOSIS — Z95.5 PRESENCE OF CORONARY ANGIOPLASTY IMPLANT AND GRAFT: ICD-10-CM

## 2020-02-24 PROCEDURE — 99213 OFFICE O/P EST LOW 20 MIN: CPT

## 2020-02-24 PROCEDURE — 93000 ELECTROCARDIOGRAM COMPLETE: CPT

## 2020-02-24 NOTE — HISTORY OF PRESENT ILLNESS
[FreeTextEntry1] : 53M with PMH of Anxiety, SocHX of Tobacco use 1ppd, occasional Marijuana use, \par and Past Heroine use (snorting modality in the 1980s) who presented to [facility] on \par 8/9/18 with chest pain for 6 week duration that had both increased in duration \par and frequency, found to have increased Trop/CKMb, EKG showing ST depressions in \par V5/V6 transferred from [facility] for NSTEMI catheterization/ management along with \par Heparin loading dose/heparin drip. Patient here was started on ASA, Brilinta, \par Lipitor, and Lopressor. 8/10 Interventional cath was done: VENKATA placed in pLCX \par Proximal RCA 30%. LM and LAD normal. Radial band removed w/o evidence of \par hematoma. Echo followed showing EF 65% with normal LV wall thickness, movement, \par and size. TSHnL, A1C 5.9; Lipid Panel: High Chol 203, Upper normal / \par [id number]. \par \par 8/12/19 USOH, 100% compliant with meds\par location: chest\par duration: na\par  modifying factors: na\par timing: na\par severity: 0/10\par EKG unchanged from prior (in chart)\par consultation notes reviewed where appropriate\par  \par 2/24/20\par EKG performed today reviewed (in chart)\par usual state of health, no new complaints\par SOB - no\par chest pain - no\par location: chest\par duration: none\par modifying factors: none\par timing: none\par severity: 0/10\par Medications: the patient is adherent with his medication regimen. denies medication side effects. \par Disease Management: the patient is doing well with goals.

## 2020-02-24 NOTE — PHYSICAL EXAM
[General Appearance - Well Developed] : well developed [Normal Appearance] : normal appearance [Well Groomed] : well groomed [General Appearance - Well Nourished] : well nourished [No Deformities] : no deformities [General Appearance - In No Acute Distress] : no acute distress [Eyelids - No Xanthelasma] : the eyelids demonstrated no xanthelasmas [Normal Conjunctiva] : the conjunctiva exhibited no abnormalities [No Oral Pallor] : no oral pallor [Normal Oral Mucosa] : normal oral mucosa [No Oral Cyanosis] : no oral cyanosis [Normal Jugular Venous A Waves Present] : normal jugular venous A waves present [Normal Jugular Venous V Waves Present] : normal jugular venous V waves present [No Jugular Venous Mccallum A Waves] : no jugular venous mccallum A waves [Respiration, Rhythm And Depth] : normal respiratory rhythm and effort [Exaggerated Use Of Accessory Muscles For Inspiration] : no accessory muscle use [Heart Rate And Rhythm] : heart rate and rhythm were normal [Auscultation Breath Sounds / Voice Sounds] : lungs were clear to auscultation bilaterally [Heart Sounds] : normal S1 and S2 [Murmurs] : no murmurs present [Abdomen Soft] : soft [Abdomen Mass (___ Cm)] : no abdominal mass palpated [Abdomen Tenderness] : non-tender [Abnormal Walk] : normal gait [Gait - Sufficient For Exercise Testing] : the gait was sufficient for exercise testing [Cyanosis, Localized] : no localized cyanosis [Nail Clubbing] : no clubbing of the fingernails [Petechial Hemorrhages (___cm)] : no petechial hemorrhages [Skin Color & Pigmentation] : normal skin color and pigmentation [] : no rash [No Venous Stasis] : no venous stasis [Skin Lesions] : no skin lesions [No Xanthoma] : no  xanthoma was observed [No Skin Ulcers] : no skin ulcer [Mood] : the mood was normal [Oriented To Time, Place, And Person] : oriented to person, place, and time [Affect] : the affect was normal [No Anxiety] : not feeling anxious

## 2020-02-24 NOTE — DISCUSSION/SUMMARY
[Coronary Artery Disease] : coronary artery disease [Outpatient Evaluation] : outpatient evaluation [Hyperlipidemia] : hyperlipidemia [Echocardiogram] : echocardiogram [Medication Changes Per Orders] : as documented in orders [Hypertension] : hypertension [Stable] : stable

## 2020-04-24 ENCOUNTER — APPOINTMENT (OUTPATIENT)
Dept: HEART AND VASCULAR | Facility: CLINIC | Age: 55
End: 2020-04-24

## 2020-11-12 ENCOUNTER — APPOINTMENT (OUTPATIENT)
Dept: HEART AND VASCULAR | Facility: CLINIC | Age: 55
End: 2020-11-12
Payer: COMMERCIAL

## 2020-11-12 VITALS
HEART RATE: 83 BPM | SYSTOLIC BLOOD PRESSURE: 118 MMHG | OXYGEN SATURATION: 97 % | BODY MASS INDEX: 26.37 KG/M2 | WEIGHT: 173.99 LBS | HEIGHT: 68 IN | DIASTOLIC BLOOD PRESSURE: 90 MMHG | TEMPERATURE: 98.6 F

## 2020-11-12 DIAGNOSIS — Z23 ENCOUNTER FOR IMMUNIZATION: ICD-10-CM

## 2020-11-12 LAB
ALBUMIN SERPL ELPH-MCNC: 4.5 G/DL
ALP BLD-CCNC: 86 U/L
ALT SERPL-CCNC: 30 U/L
ANION GAP SERPL CALC-SCNC: 16 MMOL/L
AST SERPL-CCNC: 18 U/L
BILIRUB SERPL-MCNC: 0.3 MG/DL
BUN SERPL-MCNC: 16 MG/DL
CALCIUM SERPL-MCNC: 9.4 MG/DL
CHLORIDE SERPL-SCNC: 102 MMOL/L
CHOLEST SERPL-MCNC: 130 MG/DL
CO2 SERPL-SCNC: 22 MMOL/L
CREAT SERPL-MCNC: 0.78 MG/DL
ESTIMATED AVERAGE GLUCOSE: 131 MG/DL
GLUCOSE SERPL-MCNC: 101 MG/DL
HBA1C MFR BLD HPLC: 6.2 %
HDLC SERPL-MCNC: 49 MG/DL
LDLC SERPL CALC-MCNC: 67 MG/DL
NONHDLC SERPL-MCNC: 81 MG/DL
POTASSIUM SERPL-SCNC: 4.7 MMOL/L
PROT SERPL-MCNC: 6.9 G/DL
SODIUM SERPL-SCNC: 139 MMOL/L
TRIGL SERPL-MCNC: 70 MG/DL

## 2020-11-12 PROCEDURE — 99072 ADDL SUPL MATRL&STAF TM PHE: CPT

## 2020-11-12 PROCEDURE — 93000 ELECTROCARDIOGRAM COMPLETE: CPT

## 2020-11-12 PROCEDURE — 99214 OFFICE O/P EST MOD 30 MIN: CPT | Mod: 25

## 2020-11-12 PROCEDURE — 90686 IIV4 VACC NO PRSV 0.5 ML IM: CPT

## 2020-11-12 PROCEDURE — 36415 COLL VENOUS BLD VENIPUNCTURE: CPT

## 2020-11-12 PROCEDURE — 90471 IMMUNIZATION ADMIN: CPT

## 2020-11-12 NOTE — ASSESSMENT
[FreeTextEntry1] : chest pain will do stress test had residual rca disease\par increase atorvastatin to 80 mg daily\par check labs \par see me in six months pending his test results

## 2020-11-12 NOTE — HISTORY OF PRESENT ILLNESS
[FreeTextEntry1] : 55 M former smoker NSTEMI stent to prox LCX 2018 former smoker here for follow up complainining of chest pain with emotional distress he cannot walk much due to sob thinks his lungs are shot\par \par ecg nsr

## 2020-12-17 ENCOUNTER — APPOINTMENT (OUTPATIENT)
Dept: HEART AND VASCULAR | Facility: CLINIC | Age: 55
End: 2020-12-17

## 2021-02-18 ENCOUNTER — APPOINTMENT (OUTPATIENT)
Dept: INTERNAL MEDICINE | Facility: CLINIC | Age: 56
End: 2021-02-18

## 2021-02-26 ENCOUNTER — APPOINTMENT (OUTPATIENT)
Dept: INTERNAL MEDICINE | Facility: CLINIC | Age: 56
End: 2021-02-26
Payer: COMMERCIAL

## 2021-02-26 ENCOUNTER — LABORATORY RESULT (OUTPATIENT)
Age: 56
End: 2021-02-26

## 2021-02-26 VITALS
DIASTOLIC BLOOD PRESSURE: 105 MMHG | OXYGEN SATURATION: 98 % | BODY MASS INDEX: 24.62 KG/M2 | SYSTOLIC BLOOD PRESSURE: 139 MMHG | WEIGHT: 172 LBS | HEIGHT: 70 IN | HEART RATE: 84 BPM | TEMPERATURE: 97.4 F

## 2021-02-26 DIAGNOSIS — Z00.00 ENCOUNTER FOR GENERAL ADULT MEDICAL EXAMINATION W/OUT ABNORMAL FINDINGS: ICD-10-CM

## 2021-02-26 PROCEDURE — 99213 OFFICE O/P EST LOW 20 MIN: CPT | Mod: 25

## 2021-02-26 PROCEDURE — 36415 COLL VENOUS BLD VENIPUNCTURE: CPT

## 2021-02-26 PROCEDURE — 99396 PREV VISIT EST AGE 40-64: CPT | Mod: 25

## 2021-02-26 PROCEDURE — 93000 ELECTROCARDIOGRAM COMPLETE: CPT

## 2021-02-26 PROCEDURE — 99072 ADDL SUPL MATRL&STAF TM PHE: CPT

## 2021-02-26 RX ORDER — DICYCLOMINE HYDROCHLORIDE 10 MG/1
10 CAPSULE ORAL 4 TIMES DAILY
Qty: 20 | Refills: 3 | Status: DISCONTINUED | COMMUNITY
Start: 2019-11-11 | End: 2021-02-26

## 2021-02-28 LAB
25(OH)D3 SERPL-MCNC: 40.8 NG/ML
APPEARANCE: CLEAR
BASOPHILS # BLD AUTO: 0.04 K/UL
BASOPHILS NFR BLD AUTO: 0.4 %
BILIRUBIN URINE: NEGATIVE
BLOOD URINE: NEGATIVE
COLOR: YELLOW
CREAT SPEC-SCNC: 299 MG/DL
EOSINOPHIL # BLD AUTO: 0.24 K/UL
EOSINOPHIL NFR BLD AUTO: 2.4 %
GLUCOSE QUALITATIVE U: NEGATIVE
HCT VFR BLD CALC: 52 %
HGB BLD-MCNC: 16.6 G/DL
IMM GRANULOCYTES NFR BLD AUTO: 0.4 %
KETONES URINE: NEGATIVE
LEUKOCYTE ESTERASE URINE: NEGATIVE
LYMPHOCYTES # BLD AUTO: 2.45 K/UL
LYMPHOCYTES NFR BLD AUTO: 24.2 %
MAN DIFF?: NORMAL
MCHC RBC-ENTMCNC: 28.8 PG
MCHC RBC-ENTMCNC: 31.9 GM/DL
MCV RBC AUTO: 90.1 FL
MICROALBUMIN 24H UR DL<=1MG/L-MCNC: 1.2 MG/DL
MICROALBUMIN/CREAT 24H UR-RTO: NORMAL MG/G
MONOCYTES # BLD AUTO: 0.9 K/UL
MONOCYTES NFR BLD AUTO: 8.9 %
NEUTROPHILS # BLD AUTO: 6.44 K/UL
NEUTROPHILS NFR BLD AUTO: 63.7 %
NITRITE URINE: NEGATIVE
PH URINE: 5
PLATELET # BLD AUTO: 311 K/UL
PROTEIN URINE: NORMAL
RBC # BLD: 5.77 M/UL
RBC # FLD: 13.5 %
SPECIFIC GRAVITY URINE: 1.03
TSH SERPL-ACNC: 4.27 UIU/ML
UREA BREATH TEST QL: NEGATIVE
UROBILINOGEN URINE: NORMAL
WBC # FLD AUTO: 10.11 K/UL

## 2021-03-01 RX ORDER — FAMOTIDINE 20 MG/1
20 TABLET, FILM COATED ORAL DAILY
Qty: 30 | Refills: 0 | Status: ACTIVE | COMMUNITY
Start: 2021-03-01 | End: 1900-01-01

## 2021-04-13 ENCOUNTER — APPOINTMENT (OUTPATIENT)
Dept: RADIOLOGY | Facility: CLINIC | Age: 56
End: 2021-04-13

## 2021-04-13 ENCOUNTER — NON-APPOINTMENT (OUTPATIENT)
Age: 56
End: 2021-04-13

## 2021-04-13 ENCOUNTER — RESULT REVIEW (OUTPATIENT)
Age: 56
End: 2021-04-13

## 2021-04-13 ENCOUNTER — OUTPATIENT (OUTPATIENT)
Dept: OUTPATIENT SERVICES | Facility: HOSPITAL | Age: 56
LOS: 1 days | End: 2021-04-13
Payer: COMMERCIAL

## 2021-04-13 PROCEDURE — 73502 X-RAY EXAM HIP UNI 2-3 VIEWS: CPT | Mod: 26,LT

## 2021-04-22 ENCOUNTER — NON-APPOINTMENT (OUTPATIENT)
Age: 56
End: 2021-04-22

## 2021-04-22 ENCOUNTER — APPOINTMENT (OUTPATIENT)
Dept: INTERNAL MEDICINE | Facility: CLINIC | Age: 56
End: 2021-04-22
Payer: COMMERCIAL

## 2021-04-22 VITALS
HEART RATE: 89 BPM | DIASTOLIC BLOOD PRESSURE: 96 MMHG | WEIGHT: 178 LBS | HEIGHT: 70 IN | TEMPERATURE: 97.3 F | BODY MASS INDEX: 25.48 KG/M2 | OXYGEN SATURATION: 97 % | SYSTOLIC BLOOD PRESSURE: 150 MMHG

## 2021-04-22 DIAGNOSIS — R79.89 OTHER SPECIFIED ABNORMAL FINDINGS OF BLOOD CHEMISTRY: ICD-10-CM

## 2021-04-22 DIAGNOSIS — K21.9 GASTRO-ESOPHAGEAL REFLUX DISEASE W/OUT ESOPHAGITIS: ICD-10-CM

## 2021-04-22 DIAGNOSIS — M25.552 PAIN IN LEFT HIP: ICD-10-CM

## 2021-04-22 DIAGNOSIS — I25.10 ATHEROSCLEROTIC HEART DISEASE OF NATIVE CORONARY ARTERY W/OUT ANGINA PECTORIS: ICD-10-CM

## 2021-04-22 DIAGNOSIS — Z98.61 ATHEROSCLEROTIC HEART DISEASE OF NATIVE CORONARY ARTERY W/OUT ANGINA PECTORIS: ICD-10-CM

## 2021-04-22 DIAGNOSIS — R73.03 PREDIABETES.: ICD-10-CM

## 2021-04-22 PROCEDURE — 99072 ADDL SUPL MATRL&STAF TM PHE: CPT

## 2021-04-22 PROCEDURE — 99214 OFFICE O/P EST MOD 30 MIN: CPT | Mod: 25

## 2021-04-22 PROCEDURE — 36415 COLL VENOUS BLD VENIPUNCTURE: CPT

## 2021-04-23 LAB
BASOPHILS # BLD AUTO: 0.05 K/UL
BASOPHILS NFR BLD AUTO: 0.6 %
CHOLEST SERPL-MCNC: 128 MG/DL
EOSINOPHIL # BLD AUTO: 0.25 K/UL
EOSINOPHIL NFR BLD AUTO: 3.2 %
ESTIMATED AVERAGE GLUCOSE: 131 MG/DL
HBA1C MFR BLD HPLC: 6.2 %
HCT VFR BLD CALC: 48.9 %
HDLC SERPL-MCNC: 51 MG/DL
HGB BLD-MCNC: 15.6 G/DL
IMM GRANULOCYTES NFR BLD AUTO: 0.1 %
LDLC SERPL CALC-MCNC: 59 MG/DL
LYMPHOCYTES # BLD AUTO: 2.12 K/UL
LYMPHOCYTES NFR BLD AUTO: 27.5 %
MAN DIFF?: NORMAL
MCHC RBC-ENTMCNC: 29.3 PG
MCHC RBC-ENTMCNC: 31.9 GM/DL
MCV RBC AUTO: 91.7 FL
MONOCYTES # BLD AUTO: 0.76 K/UL
MONOCYTES NFR BLD AUTO: 9.9 %
NEUTROPHILS # BLD AUTO: 4.52 K/UL
NEUTROPHILS NFR BLD AUTO: 58.7 %
NONHDLC SERPL-MCNC: 77 MG/DL
PLATELET # BLD AUTO: 297 K/UL
RBC # BLD: 5.33 M/UL
RBC # FLD: 13.2 %
TRIGL SERPL-MCNC: 92 MG/DL
TSH SERPL-ACNC: 2.71 UIU/ML
WBC # FLD AUTO: 7.71 K/UL

## 2021-04-26 ENCOUNTER — NON-APPOINTMENT (OUTPATIENT)
Age: 56
End: 2021-04-26

## 2021-05-25 ENCOUNTER — APPOINTMENT (OUTPATIENT)
Dept: INTERNAL MEDICINE | Facility: CLINIC | Age: 56
End: 2021-05-25

## 2021-10-26 ENCOUNTER — APPOINTMENT (OUTPATIENT)
Dept: HEART AND VASCULAR | Facility: CLINIC | Age: 56
End: 2021-10-26

## 2022-04-04 ENCOUNTER — RX RENEWAL (OUTPATIENT)
Age: 57
End: 2022-04-04

## 2022-04-04 RX ORDER — METOPROLOL SUCCINATE 25 MG/1
25 TABLET, EXTENDED RELEASE ORAL
Qty: 90 | Refills: 3 | Status: ACTIVE | COMMUNITY
Start: 2019-09-03 | End: 1900-01-01

## 2022-05-17 NOTE — ED ADULT NURSE NOTE - DISCHARGE DATE/TIME
09-Aug-2018 18:42

## 2022-08-22 ENCOUNTER — NON-APPOINTMENT (OUTPATIENT)
Age: 57
End: 2022-08-22

## 2022-08-23 ENCOUNTER — OUTPATIENT (OUTPATIENT)
Dept: OUTPATIENT SERVICES | Facility: HOSPITAL | Age: 57
LOS: 1 days | End: 2022-08-23
Payer: COMMERCIAL

## 2022-08-23 ENCOUNTER — APPOINTMENT (OUTPATIENT)
Dept: INTERNAL MEDICINE | Facility: CLINIC | Age: 57
End: 2022-08-23

## 2022-08-23 VITALS
HEART RATE: 98 BPM | BODY MASS INDEX: 24.77 KG/M2 | WEIGHT: 173 LBS | SYSTOLIC BLOOD PRESSURE: 136 MMHG | HEIGHT: 70 IN | TEMPERATURE: 97.8 F | OXYGEN SATURATION: 98 % | DIASTOLIC BLOOD PRESSURE: 94 MMHG

## 2022-08-23 VITALS
SYSTOLIC BLOOD PRESSURE: 151 MMHG | DIASTOLIC BLOOD PRESSURE: 114 MMHG | HEART RATE: 103 BPM | WEIGHT: 173.06 LBS | TEMPERATURE: 98 F | RESPIRATION RATE: 20 BRPM | OXYGEN SATURATION: 98 % | HEIGHT: 70 IN

## 2022-08-23 DIAGNOSIS — R50.9 FEVER, UNSPECIFIED: ICD-10-CM

## 2022-08-23 LAB
ALBUMIN SERPL ELPH-MCNC: 3.1 G/DL — LOW (ref 3.3–5)
ALP SERPL-CCNC: 242 U/L — HIGH (ref 40–120)
ALT FLD-CCNC: 84 U/L — HIGH (ref 10–45)
ANION GAP SERPL CALC-SCNC: 11 MMOL/L — SIGNIFICANT CHANGE UP (ref 5–17)
APTT BLD: 43.5 SEC — HIGH (ref 27.5–35.5)
AST SERPL-CCNC: 65 U/L — HIGH (ref 10–40)
BASOPHILS # BLD AUTO: 0.04 K/UL — SIGNIFICANT CHANGE UP (ref 0–0.2)
BASOPHILS NFR BLD AUTO: 0.3 % — SIGNIFICANT CHANGE UP (ref 0–2)
BILIRUB SERPL-MCNC: 0.5 MG/DL — SIGNIFICANT CHANGE UP (ref 0.2–1.2)
BLD GP AB SCN SERPL QL: NEGATIVE — SIGNIFICANT CHANGE UP
BUN SERPL-MCNC: 9 MG/DL — SIGNIFICANT CHANGE UP (ref 7–23)
CALCIUM SERPL-MCNC: 9.2 MG/DL — SIGNIFICANT CHANGE UP (ref 8.4–10.5)
CHLORIDE SERPL-SCNC: 98 MMOL/L — SIGNIFICANT CHANGE UP (ref 96–108)
CO2 SERPL-SCNC: 27 MMOL/L — SIGNIFICANT CHANGE UP (ref 22–31)
CREAT SERPL-MCNC: 0.69 MG/DL — SIGNIFICANT CHANGE UP (ref 0.5–1.3)
EGFR: 107 ML/MIN/1.73M2 — SIGNIFICANT CHANGE UP
EOSINOPHIL # BLD AUTO: 0.07 K/UL — SIGNIFICANT CHANGE UP (ref 0–0.5)
EOSINOPHIL NFR BLD AUTO: 0.5 % — SIGNIFICANT CHANGE UP (ref 0–6)
GLUCOSE SERPL-MCNC: 97 MG/DL — SIGNIFICANT CHANGE UP (ref 70–99)
HCT VFR BLD CALC: 40.8 % — SIGNIFICANT CHANGE UP (ref 39–50)
HGB BLD-MCNC: 13.2 G/DL — SIGNIFICANT CHANGE UP (ref 13–17)
IMM GRANULOCYTES NFR BLD AUTO: 1.1 % — SIGNIFICANT CHANGE UP (ref 0–1.5)
INR BLD: 1.22 — HIGH (ref 0.88–1.16)
LACTATE SERPL-SCNC: 0.9 MMOL/L — SIGNIFICANT CHANGE UP (ref 0.5–2)
LYMPHOCYTES # BLD AUTO: 1.75 K/UL — SIGNIFICANT CHANGE UP (ref 1–3.3)
LYMPHOCYTES # BLD AUTO: 12.6 % — LOW (ref 13–44)
MCHC RBC-ENTMCNC: 27.9 PG — SIGNIFICANT CHANGE UP (ref 27–34)
MCHC RBC-ENTMCNC: 32.4 GM/DL — SIGNIFICANT CHANGE UP (ref 32–36)
MCV RBC AUTO: 86.3 FL — SIGNIFICANT CHANGE UP (ref 80–100)
MONOCYTES # BLD AUTO: 1.44 K/UL — HIGH (ref 0–0.9)
MONOCYTES NFR BLD AUTO: 10.4 % — SIGNIFICANT CHANGE UP (ref 2–14)
NEUTROPHILS # BLD AUTO: 10.4 K/UL — HIGH (ref 1.8–7.4)
NEUTROPHILS NFR BLD AUTO: 75.1 % — SIGNIFICANT CHANGE UP (ref 43–77)
NRBC # BLD: 0 /100 WBCS — SIGNIFICANT CHANGE UP (ref 0–0)
PLATELET # BLD AUTO: 403 K/UL — HIGH (ref 150–400)
POTASSIUM SERPL-MCNC: 4.7 MMOL/L — SIGNIFICANT CHANGE UP (ref 3.5–5.3)
POTASSIUM SERPL-SCNC: 4.7 MMOL/L — SIGNIFICANT CHANGE UP (ref 3.5–5.3)
PROT SERPL-MCNC: 7.4 G/DL — SIGNIFICANT CHANGE UP (ref 6–8.3)
PROTHROM AB SERPL-ACNC: 14.6 SEC — HIGH (ref 10.5–13.4)
RBC # BLD: 4.73 M/UL — SIGNIFICANT CHANGE UP (ref 4.2–5.8)
RBC # FLD: 14.9 % — HIGH (ref 10.3–14.5)
RH IG SCN BLD-IMP: POSITIVE — SIGNIFICANT CHANGE UP
SARS-COV-2 RNA SPEC QL NAA+PROBE: NEGATIVE — SIGNIFICANT CHANGE UP
SODIUM SERPL-SCNC: 136 MMOL/L — SIGNIFICANT CHANGE UP (ref 135–145)
WBC # BLD: 13.85 K/UL — HIGH (ref 3.8–10.5)
WBC # FLD AUTO: 13.85 K/UL — HIGH (ref 3.8–10.5)

## 2022-08-23 PROCEDURE — 76700 US EXAM ABDOM COMPLETE: CPT

## 2022-08-23 PROCEDURE — G1004: CPT

## 2022-08-23 PROCEDURE — 76700 US EXAM ABDOM COMPLETE: CPT | Mod: 26

## 2022-08-23 PROCEDURE — 74177 CT ABD & PELVIS W/CONTRAST: CPT | Mod: 26,MG

## 2022-08-23 PROCEDURE — 99284 EMERGENCY DEPT VISIT MOD MDM: CPT

## 2022-08-23 PROCEDURE — 36415 COLL VENOUS BLD VENIPUNCTURE: CPT

## 2022-08-23 PROCEDURE — 99215 OFFICE O/P EST HI 40 MIN: CPT | Mod: 25

## 2022-08-23 RX ORDER — MOMETASONE FUROATE 1 MG/G
0.1 CREAM TOPICAL
Qty: 1 | Refills: 0 | Status: DISCONTINUED | COMMUNITY
Start: 2019-10-08 | End: 2022-08-23

## 2022-08-23 RX ORDER — SODIUM CHLORIDE 9 MG/ML
1000 INJECTION INTRAMUSCULAR; INTRAVENOUS; SUBCUTANEOUS ONCE
Refills: 0 | Status: COMPLETED | OUTPATIENT
Start: 2022-08-23 | End: 2022-08-23

## 2022-08-23 RX ORDER — MORPHINE SULFATE 50 MG/1
4 CAPSULE, EXTENDED RELEASE ORAL ONCE
Refills: 0 | Status: DISCONTINUED | OUTPATIENT
Start: 2022-08-23 | End: 2022-08-23

## 2022-08-23 RX ADMIN — SODIUM CHLORIDE 1000 MILLILITER(S): 9 INJECTION INTRAMUSCULAR; INTRAVENOUS; SUBCUTANEOUS at 19:25

## 2022-08-23 RX ADMIN — SODIUM CHLORIDE 1000 MILLILITER(S): 9 INJECTION INTRAMUSCULAR; INTRAVENOUS; SUBCUTANEOUS at 20:25

## 2022-08-23 RX ADMIN — MORPHINE SULFATE 4 MILLIGRAM(S): 50 CAPSULE, EXTENDED RELEASE ORAL at 21:07

## 2022-08-23 RX ADMIN — MORPHINE SULFATE 4 MILLIGRAM(S): 50 CAPSULE, EXTENDED RELEASE ORAL at 21:40

## 2022-08-23 NOTE — ED PROVIDER NOTE - CLINICAL SUMMARY MEDICAL DECISION MAKING FREE TEXT BOX
abdominal pain, recent dx of portal vein thrombosis on ct of unclear etiology, ?post covid hypercoagulable state, no cp/sob, no h/o dvt/pe in past. no le edema, +ttp on abd exam, will get ct a/ p with iv contrast to assess abd pain/protal vein thrombosis, re-assess (case d/w radiology, rect ct a/p with IV contrast, not CTA for further assessment)

## 2022-08-23 NOTE — ED ADULT TRIAGE NOTE - CHIEF COMPLAINT QUOTE
sent to ED from US for portal vein clot. pt rpts headache and "achy" abdominal pain. states, "I was here last week for a 105 fever but they couldn't find infection anywhere." denies CP, f/c, SOB, n/v/d.

## 2022-08-23 NOTE — ED ADULT NURSE NOTE - OBJECTIVE STATEMENT
57y male referred to ED from ultrasound for finding of clot in portal vein. Pt seen in other hospital for fever and admitted, told it was a URI. Saw PCP today who ordered US. Pt endorses headache, shoulder pain, and abdominal pain. Watery diarrhea starting today. Denies n/v. PMH of stents, on baby aspirin daily. A&Ox4.

## 2022-08-23 NOTE — ED PROVIDER NOTE - NS ED ROS FT
no fever  no eye discharge  no cp  no shortness of breath  no vomiting  no rash  no new weakness arm/leg  no oppositional behavior  no nuchal rigidity  no laceration  +abdominal pain

## 2022-08-23 NOTE — ED PROVIDER NOTE - PHYSICAL EXAMINATION
NAD  EOMI  airway patent  +S1S2 no mrg  CTA b/l  soft, mild non-specific ttp, no g/r  no le edema;  normal mood and affect, pleasant

## 2022-08-23 NOTE — ED PROVIDER NOTE - PROGRESS NOTE DETAILS
pt continues to endorse abd pain, abd soft, will add on trop, ecg, lactate, pain control, re-assess, pt pending ct carey - CT showing portal vein thrombosis  will continue w plan for admission as previously discussed by initial team [carey / uziel]  received on sign out. pt here w outpt imaging showing new portal vein thromobsis. sent by Dr Verduzco for further work-up / admission  thus far wbc count slightly elevated to 13. mild transaminitis. CTAP ordered  at time of sign out pending CT and admission

## 2022-08-23 NOTE — ED PROVIDER NOTE - OBJECTIVE STATEMENT
57m with h/o 57m with h/o cad, recently recovered from covid infection p/w abdominal pain x several days diffuse, intermittent, no n/v/dysuria/cough/cold.  pt had outpatient sono by Dr. QUINCY Verduzco showing portal vein thrombosis, pt states he went to OSH recently for abd pain but also f/c, got x-ray done there and was discharged.  abd pain has been presistent. pt denies any IVDU. complaint with meds.

## 2022-08-24 ENCOUNTER — INPATIENT (INPATIENT)
Facility: HOSPITAL | Age: 57
LOS: 0 days | Discharge: HOME | DRG: 442 | End: 2022-08-25
Attending: INTERNAL MEDICINE | Admitting: INTERNAL MEDICINE
Payer: COMMERCIAL

## 2022-08-24 ENCOUNTER — TRANSCRIPTION ENCOUNTER (OUTPATIENT)
Age: 57
End: 2022-08-24

## 2022-08-24 DIAGNOSIS — R74.01 ELEVATION OF LEVELS OF LIVER TRANSAMINASE LEVELS: ICD-10-CM

## 2022-08-24 DIAGNOSIS — I10 ESSENTIAL (PRIMARY) HYPERTENSION: ICD-10-CM

## 2022-08-24 DIAGNOSIS — R65.10 SYSTEMIC INFLAMMATORY RESPONSE SYNDROME (SIRS) OF NON-INFECTIOUS ORIGIN WITHOUT ACUTE ORGAN DYSFUNCTION: ICD-10-CM

## 2022-08-24 DIAGNOSIS — I25.10 ATHEROSCLEROTIC HEART DISEASE OF NATIVE CORONARY ARTERY WITHOUT ANGINA PECTORIS: ICD-10-CM

## 2022-08-24 DIAGNOSIS — I81 PORTAL VEIN THROMBOSIS: ICD-10-CM

## 2022-08-24 DIAGNOSIS — Z29.9 ENCOUNTER FOR PROPHYLACTIC MEASURES, UNSPECIFIED: ICD-10-CM

## 2022-08-24 LAB
ALBUMIN SERPL ELPH-MCNC: 2.7 G/DL — LOW (ref 3.3–5)
ALP SERPL-CCNC: 206 U/L — HIGH (ref 40–120)
ALT FLD-CCNC: 64 U/L — HIGH (ref 10–45)
ANION GAP SERPL CALC-SCNC: 10 MMOL/L — SIGNIFICANT CHANGE UP (ref 5–17)
APPEARANCE UR: CLEAR — SIGNIFICANT CHANGE UP
APPEARANCE: CLEAR
AST SERPL-CCNC: 37 U/L — SIGNIFICANT CHANGE UP (ref 10–40)
BASOPHILS # BLD AUTO: 0.03 K/UL
BASOPHILS # BLD AUTO: 0.03 K/UL — SIGNIFICANT CHANGE UP (ref 0–0.2)
BASOPHILS NFR BLD AUTO: 0.2 %
BASOPHILS NFR BLD AUTO: 0.2 % — SIGNIFICANT CHANGE UP (ref 0–2)
BILIRUB SERPL-MCNC: 0.5 MG/DL — SIGNIFICANT CHANGE UP (ref 0.2–1.2)
BILIRUB UR-MCNC: NEGATIVE — SIGNIFICANT CHANGE UP
BILIRUBIN URINE: NEGATIVE
BLOOD URINE: NEGATIVE
BUN SERPL-MCNC: 9 MG/DL — SIGNIFICANT CHANGE UP (ref 7–23)
CALCIUM SERPL-MCNC: 9 MG/DL — SIGNIFICANT CHANGE UP (ref 8.4–10.5)
CHLORIDE SERPL-SCNC: 99 MMOL/L — SIGNIFICANT CHANGE UP (ref 96–108)
CO2 SERPL-SCNC: 28 MMOL/L — SIGNIFICANT CHANGE UP (ref 22–31)
COLOR SPEC: YELLOW — SIGNIFICANT CHANGE UP
COLOR: YELLOW
CREAT SERPL-MCNC: 0.77 MG/DL — SIGNIFICANT CHANGE UP (ref 0.5–1.3)
CRP SERPL-MCNC: 343.9 MG/L
DIFF PNL FLD: NEGATIVE — SIGNIFICANT CHANGE UP
EGFR: 104 ML/MIN/1.73M2 — SIGNIFICANT CHANGE UP
EOSINOPHIL # BLD AUTO: 0.01 K/UL — SIGNIFICANT CHANGE UP (ref 0–0.5)
EOSINOPHIL # BLD AUTO: 0.06 K/UL
EOSINOPHIL NFR BLD AUTO: 0.1 % — SIGNIFICANT CHANGE UP (ref 0–6)
EOSINOPHIL NFR BLD AUTO: 0.4 %
ERYTHROCYTE [SEDIMENTATION RATE] IN BLOOD BY WESTERGREN METHOD: 81 MM/HR
FERRITIN SERPL-MCNC: 963 NG/ML
GLUCOSE QUALITATIVE U: NEGATIVE MG/DL
GLUCOSE SERPL-MCNC: 98 MG/DL — SIGNIFICANT CHANGE UP (ref 70–99)
GLUCOSE UR QL: NEGATIVE — SIGNIFICANT CHANGE UP
HAV IGM SER-ACNC: SIGNIFICANT CHANGE UP
HBV CORE AB SER-ACNC: REACTIVE
HBV CORE IGM SER-ACNC: SIGNIFICANT CHANGE UP
HBV SURFACE AB SER-ACNC: REACTIVE
HBV SURFACE AG SER-ACNC: SIGNIFICANT CHANGE UP
HCT VFR BLD CALC: 37.6 % — LOW (ref 39–50)
HCT VFR BLD CALC: 40.7 %
HCV AB S/CO SERPL IA: 0.04 S/CO — SIGNIFICANT CHANGE UP
HCV AB SERPL-IMP: SIGNIFICANT CHANGE UP
HGB BLD-MCNC: 12.3 G/DL — LOW (ref 13–17)
HGB BLD-MCNC: 13.2 G/DL
IMM GRANULOCYTES NFR BLD AUTO: 1.2 % — SIGNIFICANT CHANGE UP (ref 0–1.5)
IMM GRANULOCYTES NFR BLD AUTO: 1.3 %
IRON SATN MFR SERPL: 8 %
IRON SERPL-MCNC: 16 UG/DL
KETONES UR-MCNC: NEGATIVE — SIGNIFICANT CHANGE UP
KETONES URINE: NEGATIVE MG/DL
LEUKOCYTE ESTERASE UR-ACNC: NEGATIVE — SIGNIFICANT CHANGE UP
LEUKOCYTE ESTERASE URINE: NEGATIVE
LYMPHOCYTES # BLD AUTO: 1.69 K/UL
LYMPHOCYTES # BLD AUTO: 1.69 K/UL — SIGNIFICANT CHANGE UP (ref 1–3.3)
LYMPHOCYTES # BLD AUTO: 12.6 % — LOW (ref 13–44)
LYMPHOCYTES NFR BLD AUTO: 11.8 %
MAGNESIUM SERPL-MCNC: 2.2 MG/DL — SIGNIFICANT CHANGE UP (ref 1.6–2.6)
MAN DIFF?: NORMAL
MCHC RBC-ENTMCNC: 27.7 PG
MCHC RBC-ENTMCNC: 27.8 PG — SIGNIFICANT CHANGE UP (ref 27–34)
MCHC RBC-ENTMCNC: 32.4 GM/DL
MCHC RBC-ENTMCNC: 32.7 GM/DL — SIGNIFICANT CHANGE UP (ref 32–36)
MCV RBC AUTO: 84.9 FL — SIGNIFICANT CHANGE UP (ref 80–100)
MCV RBC AUTO: 85.3 FL
MONOCYTES # BLD AUTO: 1.42 K/UL — HIGH (ref 0–0.9)
MONOCYTES # BLD AUTO: 1.61 K/UL
MONOCYTES NFR BLD AUTO: 10.6 % — SIGNIFICANT CHANGE UP (ref 2–14)
MONOCYTES NFR BLD AUTO: 11.2 %
NEUTROPHILS # BLD AUTO: 10.08 K/UL — HIGH (ref 1.8–7.4)
NEUTROPHILS # BLD AUTO: 10.78 K/UL
NEUTROPHILS NFR BLD AUTO: 75.1 %
NEUTROPHILS NFR BLD AUTO: 75.3 % — SIGNIFICANT CHANGE UP (ref 43–77)
NITRITE UR-MCNC: NEGATIVE — SIGNIFICANT CHANGE UP
NITRITE URINE: NEGATIVE
NRBC # BLD: 0 /100 WBCS — SIGNIFICANT CHANGE UP (ref 0–0)
PH UR: 6 — SIGNIFICANT CHANGE UP (ref 5–8)
PH URINE: 6
PHOSPHATE SERPL-MCNC: 3.5 MG/DL — SIGNIFICANT CHANGE UP (ref 2.5–4.5)
PLATELET # BLD AUTO: 439 K/UL — HIGH (ref 150–400)
PLATELET # BLD AUTO: 448 K/UL
POTASSIUM SERPL-MCNC: 4.4 MMOL/L — SIGNIFICANT CHANGE UP (ref 3.5–5.3)
POTASSIUM SERPL-SCNC: 4.4 MMOL/L — SIGNIFICANT CHANGE UP (ref 3.5–5.3)
PROT SERPL-MCNC: 6.9 G/DL — SIGNIFICANT CHANGE UP (ref 6–8.3)
PROT UR-MCNC: NEGATIVE MG/DL — SIGNIFICANT CHANGE UP
PROTEIN URINE: NEGATIVE MG/DL
RBC # BLD: 4.43 M/UL — SIGNIFICANT CHANGE UP (ref 4.2–5.8)
RBC # BLD: 4.77 M/UL
RBC # FLD: 15 %
RBC # FLD: 15.1 % — HIGH (ref 10.3–14.5)
SODIUM SERPL-SCNC: 137 MMOL/L — SIGNIFICANT CHANGE UP (ref 135–145)
SP GR SPEC: 1.01 — SIGNIFICANT CHANGE UP (ref 1–1.03)
SPECIFIC GRAVITY URINE: 1.01
TIBC SERPL-MCNC: 190 UG/DL
UIBC SERPL-MCNC: 174 UG/DL
UROBILINOGEN FLD QL: 0.2 E.U./DL — SIGNIFICANT CHANGE UP
UROBILINOGEN URINE: 0.2 E.U./DL
WBC # BLD: 13.39 K/UL — HIGH (ref 3.8–10.5)
WBC # FLD AUTO: 13.39 K/UL — HIGH (ref 3.8–10.5)
WBC # FLD AUTO: 14.35 K/UL

## 2022-08-24 PROCEDURE — 99223 1ST HOSP IP/OBS HIGH 75: CPT | Mod: GC

## 2022-08-24 PROCEDURE — 71046 X-RAY EXAM CHEST 2 VIEWS: CPT | Mod: 26

## 2022-08-24 PROCEDURE — 71045 X-RAY EXAM CHEST 1 VIEW: CPT | Mod: 26,59

## 2022-08-24 RX ORDER — ATORVASTATIN CALCIUM 80 MG/1
80 TABLET, FILM COATED ORAL AT BEDTIME
Refills: 0 | Status: DISCONTINUED | OUTPATIENT
Start: 2022-08-24 | End: 2022-08-25

## 2022-08-24 RX ORDER — OXYCODONE HYDROCHLORIDE 5 MG/1
5 TABLET ORAL EVERY 6 HOURS
Refills: 0 | Status: DISCONTINUED | OUTPATIENT
Start: 2022-08-24 | End: 2022-08-25

## 2022-08-24 RX ORDER — ASPIRIN/CALCIUM CARB/MAGNESIUM 324 MG
81 TABLET ORAL DAILY
Refills: 0 | Status: DISCONTINUED | OUTPATIENT
Start: 2022-08-24 | End: 2022-08-25

## 2022-08-24 RX ORDER — ENOXAPARIN SODIUM 100 MG/ML
80 INJECTION SUBCUTANEOUS EVERY 12 HOURS
Refills: 0 | Status: DISCONTINUED | OUTPATIENT
Start: 2022-08-24 | End: 2022-08-25

## 2022-08-24 RX ORDER — ATORVASTATIN CALCIUM 80 MG/1
1 TABLET, FILM COATED ORAL
Qty: 0 | Refills: 0 | DISCHARGE

## 2022-08-24 RX ORDER — MORPHINE SULFATE 50 MG/1
2 CAPSULE, EXTENDED RELEASE ORAL EVERY 6 HOURS
Refills: 0 | Status: DISCONTINUED | OUTPATIENT
Start: 2022-08-24 | End: 2022-08-25

## 2022-08-24 RX ADMIN — OXYCODONE HYDROCHLORIDE 5 MILLIGRAM(S): 5 TABLET ORAL at 09:53

## 2022-08-24 RX ADMIN — OXYCODONE HYDROCHLORIDE 5 MILLIGRAM(S): 5 TABLET ORAL at 14:32

## 2022-08-24 RX ADMIN — OXYCODONE HYDROCHLORIDE 5 MILLIGRAM(S): 5 TABLET ORAL at 08:53

## 2022-08-24 RX ADMIN — ATORVASTATIN CALCIUM 80 MILLIGRAM(S): 80 TABLET, FILM COATED ORAL at 22:19

## 2022-08-24 RX ADMIN — Medication 81 MILLIGRAM(S): at 12:57

## 2022-08-24 RX ADMIN — ENOXAPARIN SODIUM 80 MILLIGRAM(S): 100 INJECTION SUBCUTANEOUS at 17:49

## 2022-08-24 RX ADMIN — OXYCODONE HYDROCHLORIDE 5 MILLIGRAM(S): 5 TABLET ORAL at 15:32

## 2022-08-24 RX ADMIN — ENOXAPARIN SODIUM 80 MILLIGRAM(S): 100 INJECTION SUBCUTANEOUS at 07:23

## 2022-08-24 NOTE — H&P ADULT - HISTORY OF PRESENT ILLNESS
CC: abd pain with CT showing left portal vein thrombosis  HPI: 56yo male with history of CAD (stented 2018), COVID in July presents to ED with persistent epigastric pain for which PCP Dr. Romeo Verduzco had patient perform ultrasound abdomen which showed left portal vein thrombosis.   Denies fevers, chills, cough, chest pain, dyspnea, nausea, headache, diarrhea, sick contactschange in urinary or bowel habits      In the ED:    - VS: Tmax: , HR:  , BP:  , RR: , O2: %     - Pertinent Labs:     - Imaging: CXR: CT: US: Cath: EKG:     - Treatment/interventions:     PMHx:   PSHx:  Meds: See med rec  Allergies:  Social: see below   CC: abd pain with CT showing left portal vein thrombosis  HPI: 56yo male with history of CAD (VENKATA LCx 2018), COVID in July presents to ED with persistent epigastric pain for which PCP Dr. Romeo Verduzco had patient perform ultrasound abdomen which showed left portal vein thrombosis. Upper midline abd pain has remained 5/10 since last week and intermittently radiates to back without provoking factor, mild diffuse tenderness when palpating any area of abd but pain is mostly upper abd, pain relieved with advil. Last week patient reported having a fever 105F, chills, headache, abd pain, and arthralgia and went to [facility] ED where patient was diagnosed with URI and discharged with ibuprofen 600mg q6h and Tylenol 500mg q4h which patient has been taking until he went to [facility] ED yesterday. Last bowel movement yesterday which was soft and mustard color, no melena or dark stool. No history of blood clot or known family history of blood clot. Notes abd pain has been stable, still has intermittent headaches but denies anymore fever and chills. Denies chest pain, dyspnea, nausea, vomiting, LE pain. COVID vaccinated Moderna and Pfizer boosted.    In the ED:    - VS: Tmax: 98.4, HR: 103, BP: 151/114, RR: 20, O2: 98% on room air    - Pertinent Labs: WBC 13.85, INR 1.22, alk chicho 242, AST 65, ALT 84    - Imaging: CT preliminary read: Left portal vein thrombosis     - Treatment/interventions: morphine IV 4mg x1, NS 1L x1    PMHx: CAD (VENKATA LCx 2018), COVID in July  PSHx: See below  Meds: See med rec  Allergies: None  Social: see below

## 2022-08-24 NOTE — H&P ADULT - NSHPLABSRESULTS_GEN_ALL_CORE
LABS:  cret                        13.2   13.85 )-----------( 403      ( 23 Aug 2022 19:00 )             40.8     08-23    136  |  98  |  9   ----------------------------<  97  4.7   |  27  |  0.69    Ca    9.2      23 Aug 2022 19:00    TPro  7.4  /  Alb  3.1<L>  /  TBili  0.5  /  DBili  x   /  AST  65<H>  /  ALT  84<H>  /  AlkPhos  242<H>  08-23    PT/INR - ( 23 Aug 2022 19:00 )   PT: 14.6 sec;   INR: 1.22          PTT - ( 23 Aug 2022 19:00 )  PTT:43.5 sec

## 2022-08-24 NOTE — DISCHARGE NOTE PROVIDER - HOSPITAL COURSE
#Discharge: do not delete    56yo male with history of CAD (VENKATA LCx 2018), COVID in July presents to ED with persistent epigastric pain for which PCP Dr. Romeo Verduzco had patient perform ultrasound abdomen which showed left portal vein thrombosis. Upper midline abd pain has remained 5/10 since last week and intermittently radiates to back without provoking factor, mild diffuse tenderness when palpating any area of abd but pain is mostly upper abd, pain relieved with advil. Last week patient reported having a fever 105F, chills, headache, abd pain, and arthralgia and went to [facility] ED where patient was diagnosed with URI and discharged with ibuprofen 600mg q6h and Tylenol 500mg q4h which patient has been taking until he went to [facility] ED yesterday. Last bowel movement yesterday which was soft and mustard color, no melena or dark stool. No history of blood clot or known family history of blood clot. Notes abd pain has been stable, still has intermittent headaches but denies anymore fever and chills. Denies chest pain, dyspnea, nausea, vomiting, LE pain. COVID vaccinated Moderna and Pfizer boosted.    #Deep vein thrombosis of portal vein.   Epigastric and abd discomfort since last week. Pain has been stable on advil 600mg q6h which he stopped taking yesterday upon arrival to ED. Continues to have intermittent headaches. Abd pain controlled with oxycodone prn.   No family history of hypercoagulable disorder, no history of blood clots. May be secondary to be COVID causing hypercoagulable state. CT scan confirms left portal vein thrombosis, with no other acute abdominal pathology. Hepatitis panel and hypercoagulable workup negative for active hepatitis infection or hypercoagulable disease process. Of note, past hepatitis B infection.     - Started on lovenox 80mg BID, transitioned to eliquis [ insert dose and freq]  - Pain controlled with oxycodone 5mg q6h for moderate pain, morphine IV 2mg q6h PRN for severe pain. C/w advil and tylenol for pain control as outpatient.    #Systemic inflammatory response syndrome (SIRS).   On admission WBC 13.8, mildly improved to 13.39, and previously tachycardic to 107, now 98.  2/4 SIRS criteria. Unclear source, likely secondary to portal vein thrombosis. CXR and UA unremarkable. Pending blood culture results.     #Transaminitis.   On admission AST and ALT mildly elevated, likely secondary to thrombosis of portal vein. Has equal mild tenderness diffusely throughout abd on palpation including RUQ. No rebound tenderness, guarding, or rigidity    Now resolved    #CAD (coronary artery disease).    s/p VENKATA to LCx 2018. Patient quit smoking after stent placement. On home ASA 81mg QD      #Tachycardia  Likely secondary to abd pain, now improved        Patient was discharged to: home  New medications: eliquis  Changes to old medications: none  Medications that were stopped: none  Items to follow up as outpatient: GI follow-up  Physical exam at the time of discharge:    GENERAL: Pt lying in bed comfortably in NAD  HEAD:  Atraumatic   EYES: EOMI, PERRL, conjunctiva and sclera clear, dry mucous membranes  NECK: Supple, No JVD  CHEST/LUNG: Clear to auscultation bilaterally; No rales, rhonchi, wheezing or rubs. Unlabored respirations  HEART: Regular rate and rhythm; No murmurs, rubs, or gallops  ABDOMEN: Bowel sounds present; mild diffuse abd tenderness on palpation, more in epigastric region. No rebound tenderness, guarding, or rigidity  EXTREMITIES:  2+ Peripheral Pulses, brisk capillary refill. No clubbing, cyanosis, or edema  NERVOUS SYSTEM:  Alert & Oriented X3, speech clear. Answers questions appropriately. Facial movements symmetrical, no facial droop, tongue protrusion midline. Full and equal 5/5 strength B/L upper and lower extremities. Sensation intact. No motor drift. No deficits   MSK: FROM x 4 extremities   SKIN: No rashes or lesions #Discharge: do not delete    56yo male with history of CAD (VENKATA LCx 2018), COVID in July presents to ED with persistent epigastric pain for which PCP Dr. Romeo Verduzco had patient perform ultrasound abdomen which showed left portal vein thrombosis. Upper midline abd pain has remained 5/10 since last week and intermittently radiates to back without provoking factor, mild diffuse tenderness when palpating any area of abd but pain is mostly upper abd, pain relieved with advil. Last week patient reported having a fever 105F, chills, headache, abd pain, and arthralgia and went to [facility] ED where patient was diagnosed with URI and discharged with ibuprofen 600mg q6h and Tylenol 500mg q4h which patient has been taking until he went to [facility] ED yesterday. Last bowel movement yesterday which was soft and mustard color, no melena or dark stool. No history of blood clot or known family history of blood clot. Notes abd pain has been stable, still has intermittent headaches but denies anymore fever and chills. Denies chest pain, dyspnea, nausea, vomiting, LE pain. COVID vaccinated Moderna and Pfizer boosted.    #Deep vein thrombosis of portal vein.   Epigastric and abd discomfort since last week. Pain has been stable on advil 600mg q6h which he stopped taking yesterday upon arrival to ED. Continues to have intermittent headaches. Abd pain controlled with oxycodone prn.   No family history of hypercoagulable disorder, no history of blood clots. May be secondary to be COVID causing hypercoagulable state. CT scan confirms left portal vein thrombosis, with no other acute abdominal pathology. Hepatitis panel negative for active hepatitis infection. Of note, past hepatitis B infection.     - Started on lovenox 80mg BID, transitioned to eliquis 10 mg   - Pain controlled with oxycodone 5mg q6h for moderate pain, morphine IV 2mg q6h PRN for severe pain. C/w advil and tylenol for pain control as outpatient.    #Systemic inflammatory response syndrome (SIRS).   On admission WBC 13.8, mildly improved to 13.39, and previously tachycardic to 107, now 98.  2/4 SIRS criteria. Unclear source, likely secondary to portal vein thrombosis. CXR and UA unremarkable. Pending blood culture results.     #Transaminitis.   On admission AST and ALT mildly elevated, likely secondary to thrombosis of portal vein. Has equal mild tenderness diffusely throughout abd on palpation including RUQ. No rebound tenderness, guarding, or rigidity    Now resolved    #CAD (coronary artery disease).    s/p VENKATA to LCx 2018. Patient quit smoking after stent placement. On home ASA 81mg QD      #Tachycardia  Likely secondary to abd pain, now improved        Patient was discharged to: home  New medications: eliquis  Changes to old medications: none  Medications that were stopped: none  Items to follow up as outpatient: GI follow-up  Physical exam at the time of discharge:    GENERAL: Pt lying in bed comfortably in NAD  HEAD:  Atraumatic   EYES: EOMI, PERRL, conjunctiva and sclera clear, dry mucous membranes  NECK: Supple, No JVD  CHEST/LUNG: Clear to auscultation bilaterally; No rales, rhonchi, wheezing or rubs. Unlabored respirations  HEART: Regular rate and rhythm; No murmurs, rubs, or gallops  ABDOMEN: Bowel sounds present; mild diffuse abd tenderness on palpation, more in epigastric region. No rebound tenderness, guarding, or rigidity  EXTREMITIES:  2+ Peripheral Pulses, brisk capillary refill. No clubbing, cyanosis, or edema  NERVOUS SYSTEM:  Alert & Oriented X3, speech clear. Answers questions appropriately. Facial movements symmetrical, no facial droop, tongue protrusion midline. Full and equal 5/5 strength B/L upper and lower extremities. Sensation intact. No motor drift. No deficits   MSK: FROM x 4 extremities   SKIN: No rashes or lesions #Discharge: do not delete    58yo male with history of CAD (VENKATA LCx 2018), COVID in July presents to ED with persistent epigastric pain for which PCP Dr. Romeo Verduzco had patient perform ultrasound abdomen which showed left portal vein thrombosis. Upper midline abd pain has remained 5/10 since last week and intermittently radiates to back without provoking factor, mild diffuse tenderness when palpating any area of abd but pain is mostly upper abd, pain relieved with advil. Last week patient reported having a fever 105F, chills, headache, abd pain, and arthralgia and went to [facility] ED where patient was diagnosed with URI and discharged with ibuprofen 600mg q6h and Tylenol 500mg q4h which patient has been taking until he went to [facility] ED yesterday. Last bowel movement yesterday which was soft and mustard color, no melena or dark stool. No history of blood clot or known family history of blood clot. Notes abd pain has been stable, still has intermittent headaches but denies anymore fever and chills. Denies chest pain, dyspnea, nausea, vomiting, LE pain. COVID vaccinated Moderna and Pfizer boosted.    #Deep vein thrombosis of portal vein.   Epigastric and abd discomfort since last week. Pain has been stable on advil 600mg q6h which he stopped taking yesterday upon arrival to ED. Continues to have intermittent headaches. Abd pain controlled with oxycodone prn.   No family history of hypercoagulable disorder, no history of blood clots. May be secondary to be COVID causing hypercoagulable state. CT scan confirms left portal vein thrombosis, with no other acute abdominal pathology. Hepatitis panel negative for active hepatitis infection. Of note, past hepatitis B infection.     - Started on lovenox 80mg BID x 2 days, transitioned to eliquis 10 mg BID for 3 days (8/26-8/28), then eliquis 5 mg BID for 6 months.  - Pain controlled with oxycodone 5mg q6h for moderate pain, morphine IV 2mg q6h PRN for severe pain. C/w advil and tylenol for pain control as outpatient.    #Systemic inflammatory response syndrome (SIRS).   On admission WBC 13.8, mildly improved to 13.39, and previously tachycardic to 107, now 98.  2/4 SIRS criteria. Unclear source, likely secondary to portal vein thrombosis. CXR and UA unremarkable. Blood cultures with no growth to date.    #Transaminitis.   On admission AST and ALT mildly elevated, likely secondary to thrombosis of portal vein. Has equal mild tenderness diffusely throughout abd on palpation including RUQ. No rebound tenderness, guarding, or rigidity. Now resolved. Repeat labs with protein gap of 4.2 and elevated alk phos. HIV testing pending, MM workup as an outpatient with PCP or hematology.    #CAD (coronary artery disease).    s/p VENKATA to LCx 2018. Patient quit smoking after stent placement. On home ASA 81mg QD      #Tachycardia  Likely secondary to abd pain, now improved        Patient was discharged to: home  New medications: eliquis  Changes to old medications: none  Medications that were stopped: none  Items to follow up as outpatient: PCP and Hematology f/u  Physical exam at the time of discharge:    GENERAL: Pt lying in bed comfortably in NAD  HEAD:  Atraumatic   EYES: EOMI, PERRL, conjunctiva and sclera clear, dry mucous membranes  NECK: Supple, No JVD  CHEST/LUNG: Clear to auscultation bilaterally; No rales, rhonchi, wheezing or rubs. Unlabored respirations  HEART: Regular rate and rhythm; No murmurs, rubs, or gallops  ABDOMEN: Bowel sounds present; mild diffuse abd tenderness on palpation, more in epigastric region. No rebound tenderness, guarding, or rigidity  EXTREMITIES:  2+ Peripheral Pulses, brisk capillary refill. No clubbing, cyanosis, or edema  NERVOUS SYSTEM:  Alert & Oriented X3, speech clear. Answers questions appropriately. Facial movements symmetrical, no facial droop, tongue protrusion midline. Full and equal 5/5 strength B/L upper and lower extremities. Sensation intact. No motor drift. No deficits   MSK: FROM x 4 extremities   SKIN: No rashes or lesions #Discharge: do not delete    56yo male with history of CAD (VENKATA LCx 2018), COVID in July presents to ED with persistent epigastric pain for which PCP Dr. Romeo Verduzco had patient perform ultrasound abdomen which showed left portal vein thrombosis. Upper midline abd pain has remained 5/10 since last week and intermittently radiates to back without provoking factor, mild diffuse tenderness when palpating any area of abd but pain is mostly upper abd, pain relieved with advil. Last week patient reported having a fever 105F, chills, headache, abd pain, and arthralgia and went to [facility] ED where patient was diagnosed with URI and discharged with ibuprofen 600mg q6h and Tylenol 500mg q4h which patient has been taking until he went to [facility] ED yesterday. Last bowel movement yesterday which was soft and mustard color, no melena or dark stool. No history of blood clot or known family history of blood clot. Notes abd pain has been stable, still has intermittent headaches but denies anymore fever and chills. Denies chest pain, dyspnea, nausea, vomiting, LE pain. COVID vaccinated Moderna and Pfizer boosted.    #Deep vein thrombosis of portal vein.   Epigastric and abd discomfort since last week. Pain has been stable on advil 600mg q6h which he stopped taking yesterday upon arrival to ED. Continues to have intermittent headaches. Abd pain controlled with oxycodone prn.   No family history of hypercoagulable disorder, no history of blood clots. May be secondary to be COVID causing hypercoagulable state. CT scan confirms left portal vein thrombosis, with no other acute abdominal pathology. Hepatitis panel negative for active hepatitis infection. Of note, past hepatitis B infection.     - Started on lovenox 80mg BID x 2 days, transitioned to eliquis 10 mg BID for 5 days (8/26-8/30), then eliquis 5 mg BID for 6 months.  - Pain controlled with oxycodone 5mg q6h for moderate pain, morphine IV 2mg q6h PRN for severe pain. C/w advil and tylenol for pain control as outpatient.    #Systemic inflammatory response syndrome (SIRS).   On admission WBC 13.8, mildly improved to 13.39, and previously tachycardic to 107, now 98.  2/4 SIRS criteria. Unclear source, likely secondary to portal vein thrombosis. CXR and UA unremarkable. Blood cultures with no growth to date.    #Transaminitis.   On admission AST and ALT mildly elevated, likely secondary to thrombosis of portal vein. Has equal mild tenderness diffusely throughout abd on palpation including RUQ. No rebound tenderness, guarding, or rigidity. Now resolved. Repeat labs with protein gap of 4.2 and elevated alk phos. HIV testing pending, MM workup as an outpatient with PCP or hematology.    #CAD (coronary artery disease).    s/p VENKATA to LCx 2018. Patient quit smoking after stent placement. On home ASA 81mg QD      #Tachycardia  Likely secondary to abd pain, now improved        Patient was discharged to: home  New medications: eliquis  Changes to old medications: none  Medications that were stopped: none  Items to follow up as outpatient: PCP and Hematology f/u  Physical exam at the time of discharge:    GENERAL: Pt lying in bed comfortably in NAD  HEAD:  Atraumatic   EYES: EOMI, PERRL, conjunctiva and sclera clear, dry mucous membranes  NECK: Supple, No JVD  CHEST/LUNG: Clear to auscultation bilaterally; No rales, rhonchi, wheezing or rubs. Unlabored respirations  HEART: Regular rate and rhythm; No murmurs, rubs, or gallops  ABDOMEN: Bowel sounds present; mild diffuse abd tenderness on palpation, more in epigastric region. No rebound tenderness, guarding, or rigidity  EXTREMITIES:  2+ Peripheral Pulses, brisk capillary refill. No clubbing, cyanosis, or edema  NERVOUS SYSTEM:  Alert & Oriented X3, speech clear. Answers questions appropriately. Facial movements symmetrical, no facial droop, tongue protrusion midline. Full and equal 5/5 strength B/L upper and lower extremities. Sensation intact. No motor drift. No deficits   MSK: FROM x 4 extremities   SKIN: No rashes or lesions

## 2022-08-24 NOTE — H&P ADULT - PROBLEM SELECTOR PLAN 4
Fluids: 1L NS in ED  Electrolytes: Replete to keep K>4 and Mg>2  Nutrition: Regular diet  DVT ppx: Lovenox  Code: DNR/DNI  Dispo: RMF s/p VENKATA to LCx 2018. Patient quit smoking after stent placement. On home ASA 81mg QD    Plan:  - c/w ASA 81mg QD    #Tachycardia  Likely secondary to abd pain    Plan:  - Pain control as above  - f/u sepsis work up s/p VENKATA to LCx 2018. Patient quit smoking after stent placement. On home ASA 81mg QD    Plan:  - c/w ASA 81mg QD    #Tachycardia  Likely secondary to abd pain, now improved    Plan:  - Pain control as above  - f/u sepsis work up

## 2022-08-24 NOTE — H&P ADULT - NSHPPHYSICALEXAM_GEN_ALL_CORE
PHYSICAL EXAM:  GENERAL: Pt lying in bed comfortably in NAD  HEAD:  Atraumatic   EYES: EOMI, PERRL, conjunctiva and sclera clear, dry mucous membranes  NECK: Supple, No JVD  CHEST/LUNG: Clear to auscultation bilaterally; No rales, rhonchi, wheezing or rubs. Unlabored respirations  HEART: Regular rate and rhythm; No murmurs, rubs, or gallops  ABDOMEN: Bowel sounds present; mild diffuse abd tenderness on palpation, more in epigastric region. No rebound tenderness, guarding, or rigidity  EXTREMITIES:  2+ Peripheral Pulses, brisk capillary refill. No clubbing, cyanosis, or edema  NERVOUS SYSTEM:  Alert & Oriented X3, speech clear. Answers questions appropriately. Facial movements symmetrical, no facial droop, tongue protrusion midline. Full and equal 5/5 strength B/L upper and lower extremities. Sensation intact. No motor drift. No deficits   MSK: FROM x 4 extremities   SKIN: No rashes or lesions

## 2022-08-24 NOTE — H&P ADULT - PROBLEM SELECTOR PLAN 3
On admission AST and ALT mildly elevated, likely secondary to thrombosis of portal vein. Has equal mild tenderness diffusely throughout abd on palpation including RUQ. No rebound tenderness, guarding, or rigidity    Plan:  - Trend AST/ALT  - RUQ abd exam to assess tenderness On admission AST and ALT mildly elevated, likely secondary to thrombosis of portal vein. Has equal mild tenderness diffusely throughout abd on palpation including RUQ. No rebound tenderness, guarding, or rigidity    Plan:  - Trend AST/ALT  - f/u hep C  - RUQ abd exam to assess tenderness On admission AST and ALT mildly elevated, likely secondary to thrombosis of portal vein. Has equal mild tenderness diffusely throughout abd on palpation including RUQ. No rebound tenderness, guarding, or rigidity    Now resolved    Plan:  - Trend AST/ALT  - f/u hep panel

## 2022-08-24 NOTE — DISCHARGE NOTE PROVIDER - NSDCCPCAREPLAN_GEN_ALL_CORE_FT
PRINCIPAL DISCHARGE DIAGNOSIS  Diagnosis: Portal vein thrombosis  Assessment and Plan of Treatment: Portal vein thrombosis (PVT) is a blood clot of the portal vein, also known as the hepatic portal vein. This vein allows blood to flow from the intestines to the liver. A PVT blocks this blood flow. Although PVT is treatable, it can be life-threatening.  Blood clots are more likely to form when the blood flows irregularly in the body. While doctors typically don’t know what causes portal vein thrombosis, there are a number of risk factors for developing this condition.  Some of the most common include inflammation of the pancreas, appendicitis, polycythemia, or excess red blood cells, cancer, cirrhosis of the liver, liver disease, trauma or injury.  Other risk factors that can contribute to PVT include pregnancy and surgery. In both cases, the blood is more likely to clot, restricting blood flow to other extremities. In more severe cases, these factors can cause life-threatening complications.  We started you on an oral blood thinner called eliquis to help prevent further clotting. You should follow-up with a primary care physician as well as a hematologist for continued long term care.

## 2022-08-24 NOTE — H&P ADULT - ATTENDING COMMENTS
58 yo male with no prior hx of Hepatitis , Blood transfusion , Consuming around 18 drinks a week until 8 years ago , no hx of pancreatitis , no hx of IV drug use admitted to the hospital with Portal Vein Thrombosis . No concerns for mesenteric ischemia , Retroperitoneal fibrosis. NO weight loss , no dysphagia or Odynophagia , Recent Covid 1 month ago    Treat with Therapeutic Dose Lovenox , Start Hypercoagulable work up , Potential DC in am with oral anticoagulation and outpatient follow up

## 2022-08-24 NOTE — H&P ADULT - PROBLEM SELECTOR PLAN 5
Fluids: 1L NS in ED  Electrolytes: Replete to keep K>4 and Mg>2  Nutrition: Regular diet  DVT ppx: Lovenox  Code: DNR/DNI  Dispo: RMF Fluids: 1L NS in ED  Electrolytes: Replete to keep K>4 and Mg>2  Nutrition: Regular diet  DVT ppx: Lovenox (at thrombus therapeutic dosing, 1 mg/kg)  Code: DNR/DNI  Dispo: [facility]

## 2022-08-24 NOTE — H&P ADULT - ASSESSMENT
58yo male with history of CAD (VENKATA LCx 2018), COVID in July presents to ED with persistent epigastric pain with CT showing thrombosis of left portal vein.

## 2022-08-24 NOTE — DISCHARGE NOTE PROVIDER - NSDCFUSCHEDAPPT_GEN_ALL_CORE_FT
Romeo Verduzco Physician [facility]  [facility] [address]  Scheduled Appointment: 08/26/2022    Romeo Verduzco  [facility]  [facility] [address]  Scheduled Appointment: 09/06/2022

## 2022-08-24 NOTE — DISCHARGE NOTE PROVIDER - CARE PROVIDER_API CALL
Catherine Paredes)  Hematology; Internal Medicine; Medical Oncology  [address], [place] [address]  Phone: [phone]  Fax: [phone]  Follow Up Time: 2 weeks

## 2022-08-24 NOTE — H&P ADULT - PROBLEM SELECTOR PLAN 2
s/p VENKATA to LCx 2018. Patient quit smoking after stent placement. On home ASA 81mg QD    Plan:  - c/w ASA 81mg QD    #Tachycardia  Likely secondary to abd pain    Plan:  - Pain control as above On admission WBC 13.8 and tachycardic 2/4 SIRS criteria. Unclear source, may be secondary to portal vein thrombosis.    Plan:  - f/u CXR  - f/u urinalysis  - f/u blood cultures  - Will obtain rectal temp On admission WBC 13.8, mildly improved to 13.39, and previously tachycardic to 107, now 98.  2/4 SIRS criteria. Unclear source, may be secondary to portal vein thrombosis.    Plan:  - f/u CXR  - f/u urinalysis  - f/u blood cultures

## 2022-08-24 NOTE — H&P ADULT - PROBLEM SELECTOR PLAN 1
Epigastric and abd discomfort since last week. Pain has been stable on advil 600mg q6h which he stopped taking yesterday upon arrival to ED. Of note, abd pain started last Wednesday along with fever, headache, arthralgia (fever has resolved but still has intermittent headaches). CT scan preliminary read shows left portal vein thrombosis.    Plan:  - Start lovenox 80mg BID  - Vascular consult in AM  - Pain control with ketorolac IV 15mg q6h PRN for mild-moderate pain, morphine IV 2mg q6h PRN for severe pain Epigastric and abd discomfort since last week. Pain has been stable on advil 600mg q6h which he stopped taking yesterday upon arrival to ED. Of note, abd pain started last Wednesday along with fever, headache, arthralgia (fever has resolved but still has intermittent headaches). No family history of hypercoagulable disorder, no history of blood clots. May be secondary to be COVID causing hypercoagulable state. CT scan preliminary read shows left portal vein thrombosis.    Plan:  - Start lovenox 80mg BID  - Vascular and GI consult in AM  - f/u lupus anti-coagulant, beta-2-glycoprotein, anti-cardiolipin  - Pain control with oxycodone 5mg q6h for moderate pain, morphine IV 2mg q6h PRN for severe pain Epigastric and abd discomfort since last week. Pain has been stable on advil 600mg q6h which he stopped taking yesterday upon arrival to ED. Of note, abd pain started last Wednesday along with fever, headache, arthralgia (fever has resolved but still has intermittent headaches). No family history of hypercoagulable disorder, no history of blood clots. May be secondary to be COVID causing hypercoagulable state. CT scan preliminary read shows left portal vein thrombosis.    Plan:  - Start lovenox 80mg BID  - f/u lupus anti-coagulant, beta-2-glycoprotein, anti-cardiolipin  - f/u hep panel  - Pain control with oxycodone 5mg q6h for moderate pain, morphine IV 2mg q6h PRN for severe pain

## 2022-08-24 NOTE — DISCHARGE NOTE PROVIDER - NSDCMRMEDTOKEN_GEN_ALL_CORE_FT
aspirin 81 mg oral delayed release tablet: 1 tab(s) orally once a day  atorvastatin 80 mg oral tablet: 1 tab(s) orally once a day  ibuprofen 600 mg oral tablet: 1 tab(s) orally every 6 hours  Toprol-XL 25 mg oral tablet, extended release: 1 tab(s) orally once a day   Tylenol 500 mg oral tablet: 1 tab(s) orally every 4 hours   aspirin 81 mg oral delayed release tablet: 1 tab(s) orally once a day  atorvastatin 80 mg oral tablet: 1 tab(s) orally once a day  Eliquis 5 mg oral tablet: 2 tab(s) orally once a day MDD:2 tabs  Eliquis 5 mg oral tablet: 1 tab(s) orally once a day MDD:1 tab      ibuprofen 600 mg oral tablet: 1 tab(s) orally every 6 hours  Toprol-XL 25 mg oral tablet, extended release: 1 tab(s) orally once a day   Tylenol 500 mg oral tablet: 1 tab(s) orally every 4 hours   aspirin 81 mg oral delayed release tablet: 1 tab(s) orally once a day  atorvastatin 80 mg oral tablet: 1 tab(s) orally once a day  Eliquis 5 mg oral tablet: 2 tab(s) orally 2 times a day MDD:4 tabs  Eliquis 5 mg oral tablet: 1 tab(s) orally 2 times a day   Toprol-XL 25 mg oral tablet, extended release: 1 tab(s) orally once a day    aspirin 81 mg oral delayed release tablet: 1 tab(s) orally once a day  atorvastatin 80 mg oral tablet: 1 tab(s) orally once a day  Eliquis Starter Pack for Treatment of DVT and PE 5 mg oral tablet: 2 tab(s) orally 2 times a day for 3 days (8/26-8/28), then 1 tab 2 times a day (starting 8/29)  Toprol-XL 25 mg oral tablet, extended release: 1 tab(s) orally once a day    aspirin 81 mg oral delayed release tablet: 1 tab(s) orally once a day  atorvastatin 80 mg oral tablet: 1 tab(s) orally once a day  Eliquis Starter Pack for Treatment of DVT and PE 5 mg oral tablet: 2 tab(s) orally 2 times a day from (8/26-8/30), then 1 tab 2 times a day starting 8/31  Toprol-XL 25 mg oral tablet, extended release: 1 tab(s) orally once a day

## 2022-08-25 ENCOUNTER — TRANSCRIPTION ENCOUNTER (OUTPATIENT)
Age: 57
End: 2022-08-25

## 2022-08-25 VITALS
RESPIRATION RATE: 18 BRPM | OXYGEN SATURATION: 94 % | DIASTOLIC BLOOD PRESSURE: 91 MMHG | SYSTOLIC BLOOD PRESSURE: 139 MMHG | HEART RATE: 95 BPM | TEMPERATURE: 99 F

## 2022-08-25 LAB
25(OH)D3 SERPL-MCNC: 45.9 NG/ML
ALBUMIN SERPL ELPH-MCNC: 3.4 G/DL
ALP BLD-CCNC: 247 U/L
ALT SERPL-CCNC: 78 U/L
ANION GAP SERPL CALC-SCNC: 12 MMOL/L
AST SERPL-CCNC: 42 U/L
BILIRUB SERPL-MCNC: 0.4 MG/DL
BUN SERPL-MCNC: 9 MG/DL
CALCIUM SERPL-MCNC: 8.8 MG/DL
CHLORIDE SERPL-SCNC: 100 MMOL/L
CO2 SERPL-SCNC: 24 MMOL/L
CREAT SERPL-MCNC: 0.72 MG/DL
EGFR: 106 ML/MIN/1.73M2
GLUCOSE SERPL-MCNC: 110 MG/DL
HIV 1+2 AB+HIV1 P24 AG SERPL QL IA: SIGNIFICANT CHANGE UP
POTASSIUM SERPL-SCNC: 4.3 MMOL/L
PROT SERPL-MCNC: 6.7 G/DL
PSA SERPL-MCNC: 1.35 NG/ML
SODIUM SERPL-SCNC: 136 MMOL/L
TSH SERPL-ACNC: 3.13 UIU/ML
VIT B12 SERPL-MCNC: 1066 PG/ML

## 2022-08-25 PROCEDURE — 87635 SARS-COV-2 COVID-19 AMP PRB: CPT

## 2022-08-25 PROCEDURE — 71045 X-RAY EXAM CHEST 1 VIEW: CPT

## 2022-08-25 PROCEDURE — 85598 HEXAGNAL PHOSPH PLTLT NEUTRL: CPT

## 2022-08-25 PROCEDURE — 86901 BLOOD TYPING SEROLOGIC RH(D): CPT

## 2022-08-25 PROCEDURE — 83735 ASSAY OF MAGNESIUM: CPT

## 2022-08-25 PROCEDURE — G1004: CPT

## 2022-08-25 PROCEDURE — 85610 PROTHROMBIN TIME: CPT

## 2022-08-25 PROCEDURE — 86709 HEPATITIS A IGM ANTIBODY: CPT

## 2022-08-25 PROCEDURE — 86705 HEP B CORE ANTIBODY IGM: CPT

## 2022-08-25 PROCEDURE — 86704 HEP B CORE ANTIBODY TOTAL: CPT

## 2022-08-25 PROCEDURE — 86900 BLOOD TYPING SEROLOGIC ABO: CPT

## 2022-08-25 PROCEDURE — 87389 HIV-1 AG W/HIV-1&-2 AB AG IA: CPT

## 2022-08-25 PROCEDURE — 87040 BLOOD CULTURE FOR BACTERIA: CPT

## 2022-08-25 PROCEDURE — 87340 HEPATITIS B SURFACE AG IA: CPT

## 2022-08-25 PROCEDURE — 85025 COMPLETE CBC W/AUTO DIFF WBC: CPT

## 2022-08-25 PROCEDURE — 74177 CT ABD & PELVIS W/CONTRAST: CPT | Mod: MG

## 2022-08-25 PROCEDURE — 80053 COMPREHEN METABOLIC PANEL: CPT

## 2022-08-25 PROCEDURE — 84484 ASSAY OF TROPONIN QUANT: CPT

## 2022-08-25 PROCEDURE — 84100 ASSAY OF PHOSPHORUS: CPT

## 2022-08-25 PROCEDURE — 83605 ASSAY OF LACTIC ACID: CPT

## 2022-08-25 PROCEDURE — 71046 X-RAY EXAM CHEST 2 VIEWS: CPT

## 2022-08-25 PROCEDURE — 36415 COLL VENOUS BLD VENIPUNCTURE: CPT

## 2022-08-25 PROCEDURE — 96374 THER/PROPH/DIAG INJ IV PUSH: CPT

## 2022-08-25 PROCEDURE — 86803 HEPATITIS C AB TEST: CPT

## 2022-08-25 PROCEDURE — 99285 EMERGENCY DEPT VISIT HI MDM: CPT | Mod: 25

## 2022-08-25 PROCEDURE — 96361 HYDRATE IV INFUSION ADD-ON: CPT

## 2022-08-25 PROCEDURE — 85730 THROMBOPLASTIN TIME PARTIAL: CPT

## 2022-08-25 PROCEDURE — 83690 ASSAY OF LIPASE: CPT

## 2022-08-25 PROCEDURE — 86850 RBC ANTIBODY SCREEN: CPT

## 2022-08-25 PROCEDURE — 86706 HEP B SURFACE ANTIBODY: CPT

## 2022-08-25 PROCEDURE — 99239 HOSP IP/OBS DSCHRG MGMT >30: CPT | Mod: GC

## 2022-08-25 PROCEDURE — 81003 URINALYSIS AUTO W/O SCOPE: CPT

## 2022-08-25 RX ORDER — IBUPROFEN 200 MG
1 TABLET ORAL
Qty: 0 | Refills: 0 | DISCHARGE

## 2022-08-25 RX ORDER — ACETAMINOPHEN 500 MG
1 TABLET ORAL
Qty: 0 | Refills: 0 | DISCHARGE

## 2022-08-25 RX ORDER — APIXABAN 2.5 MG/1
2 TABLET, FILM COATED ORAL
Qty: 6 | Refills: 0
Start: 2022-08-25 | End: 2022-08-27

## 2022-08-25 RX ORDER — ACETAMINOPHEN 500 MG
650 TABLET ORAL ONCE
Refills: 0 | Status: COMPLETED | OUTPATIENT
Start: 2022-08-25 | End: 2022-08-25

## 2022-08-25 RX ADMIN — ENOXAPARIN SODIUM 80 MILLIGRAM(S): 100 INJECTION SUBCUTANEOUS at 06:29

## 2022-08-25 RX ADMIN — Medication 81 MILLIGRAM(S): at 11:09

## 2022-08-25 RX ADMIN — Medication 650 MILLIGRAM(S): at 00:45

## 2022-08-25 NOTE — PROGRESS NOTE ADULT - PROBLEM SELECTOR PLAN 5
Fluids: 1L NS in ED  Electrolytes: Replete to keep K>4 and Mg>2  Nutrition: Regular diet  DVT ppx: Lovenox (at thrombus therapeutic dosing, 1 mg/kg)  Code: DNR/DNI  Dispo: [facility] Fluids: 1L NS in ED  Electrolytes: Replete to keep K>4 and Mg>2  Nutrition: Regular diet  DVT ppx: Lovenox (at thrombus therapeutic dosing, 1 mg/kg)  Code: Full Code  Dispo: [facility]

## 2022-08-25 NOTE — PROGRESS NOTE ADULT - PROBLEM SELECTOR PLAN 4
s/p VENKATA to LCx 2018. Patient quit smoking after stent placement. On home ASA 81mg QD    Plan:  - c/w ASA 81mg QD    #Tachycardia  Likely secondary to abd pain, now improved    Plan:  - Pain control as above  - f/u sepsis work up

## 2022-08-25 NOTE — PROGRESS NOTE ADULT - PROBLEM SELECTOR PLAN 3
On admission AST and ALT mildly elevated, likely secondary to thrombosis of portal vein. Has equal mild tenderness diffusely throughout abd on palpation including RUQ. No rebound tenderness, guarding, or rigidity    Now resolved    Plan:  - Trend AST/ALT  - f/u hep panel On admission AST and ALT mildly elevated, likely secondary to thrombosis of portal vein. Has equal mild tenderness diffusely throughout abd on palpation including RUQ. No rebound tenderness, guarding, or rigidity    Improved. Alk phos still mildly elevated, protein gap of 4.2. Ddx includes MM, HIV, Hep C (negative labs).     Plan:  - Trend AST/ALT  - HIV antigen/antibody  - outpatient workup for MM

## 2022-08-25 NOTE — PROGRESS NOTE ADULT - PROBLEM SELECTOR PLAN 1
Epigastric and abd discomfort since last week. Pain has been stable on advil 600mg q6h which he stopped taking yesterday upon arrival to ED. Of note, abd pain started last Wednesday along with fever, headache, arthralgia (fever has resolved but still has intermittent headaches). No family history of hypercoagulable disorder, no history of blood clots. May be secondary to be COVID causing hypercoagulable state. CT scan preliminary read shows left portal vein thrombosis.    Plan:  - Start lovenox 80mg BID  - f/u lupus anti-coagulant, beta-2-glycoprotein, anti-cardiolipin  - f/u hep panel  - Pain control with oxycodone 5mg q6h for moderate pain, morphine IV 2mg q6h PRN for severe pain Abdominal pain resolved. Pt to be discharged on eliquis with outpatient f/u    Epigastric and abd discomfort since last week. Pain has been stable on advil 600mg q6h which he stopped taking yesterday upon arrival to ED. Of note, abd pain started last Wednesday along with fever, headache, arthralgia (fever has resolved but still has intermittent headaches). No family history of hypercoagulable disorder, no history of blood clots. May be secondary to be COVID causing hypercoagulable state. CT scan preliminary read shows left portal vein thrombosis.    Plan:  - C/w lovenox 80mg BID, transition to eliquis 10 mg for 3 days, then eliquis 5 mg for a total duration of 6 months, pending outpatient hematology workup  - f/u lupus anti-coagulant, beta-2-glycoprotein, anti-cardiolipin  - Pain control with oxycodone 5mg q6h for moderate pain, morphine IV 2mg q6h PRN for severe pain

## 2022-08-25 NOTE — PROGRESS NOTE ADULT - SUBJECTIVE AND OBJECTIVE BOX
INTERVAL HPI/OVERNIGHT EVENTS:  Patient was seen and examined at bedside. As per nurse and patient, no o/n events, patient resting comfortably. No complaints at this time. Patient denies: fever, chills, lightheadedness, weakness, CP, palpitations, SOB, cough, N/V. ROS otherwise negative.    VITAL SIGNS:  T(F): 97 (22 @ 05:06)  HR: 81 (22 @ 05:06)  BP: 120/85 (22 @ 05:06)  RR: 19 (22 @ 05:06)  SpO2: 96% (22 @ 05:06)  Wt(kg): --        PHYSICAL EXAM:    Constitutional: resting comfortably in bed; NAD  HEENT: NC/AT, PER, anicteric sclera, no nasal discharge; MMM  Neck: supple; no JVD or thyromegaly  Respiratory: CTA B/L; no W/R/R, no retractions  Cardiac: +S1/S2; RRR; no M/R/G  Gastrointestinal: soft, NT/ND; no rebound or guarding  Back: spine midline, no bony tenderness or step-offs; no CVAT B/L  Extremities: WWP, no clubbing or cyanosis; no peripheral edema  Musculoskeletal: NROM x4; no joint swelling, tenderness or erythema  Vascular: 2+ radial, DP/PT pulses B/L  Dermatologic: skin warm, dry and intact; no rashes, wounds, or scars  Neurologic: AAOx3; CNII-XII grossly intact; no focal deficits  Psychiatric: affect and characteristics of appearance, verbalizations, behaviors are appropriate    MEDICATIONS  (STANDING):  aspirin  chewable 81 milliGRAM(s) Oral daily  atorvastatin 80 milliGRAM(s) Oral at bedtime  enoxaparin Injectable 80 milliGRAM(s) SubCutaneous every 12 hours    MEDICATIONS  (PRN):  morphine  - Injectable 2 milliGRAM(s) IV Push every 6 hours PRN Severe Pain (7 - 10)  oxyCODONE    IR 5 milliGRAM(s) Oral every 6 hours PRN Moderate Pain (4 - 6)      Allergies    No Known Allergies    Intolerances        LABS:                        12.3   13.39 )-----------( 439      ( 24 Aug 2022 05:30 )             37.6         137  |  99  |  9   ----------------------------<  98  4.4   |  28  |  0.77    Ca    9.0      24 Aug 2022 05:30  Phos  3.5       Mg     2.2         TPro  6.9  /  Alb  2.7<L>  /  TBili  0.5  /  DBili  x   /  AST  37  /  ALT  64<H>  /  AlkPhos  206<H>      PT/INR - ( 23 Aug 2022 19:00 )   PT: 14.6 sec;   INR: 1.22          PTT - ( 23 Aug 2022 19:00 )  PTT:43.5 sec  Urinalysis Basic - ( 24 Aug 2022 19:44 )    Color: Yellow / Appearance: Clear / S.015 / pH: x  Gluc: x / Ketone: NEGATIVE  / Bili: Negative / Urobili: 0.2 E.U./dL   Blood: x / Protein: NEGATIVE mg/dL / Nitrite: NEGATIVE   Leuk Esterase: NEGATIVE / RBC: x / WBC x   Sq Epi: x / Non Sq Epi: x / Bacteria: x        RADIOLOGY & ADDITIONAL TESTS:  Reviewed INTERVAL HPI/OVERNIGHT EVENTS:  Patient was seen and examined at bedside. As per patient, requested tylenol overnight for headache, which caused him to have profuse cold sweats. States that he has had this reaction multiple times in the past. Currently without headache and abdominal pain. Endorses significant improvement in symptoms. No complaints at this time. Patient denies: fever, chills, lightheadedness, weakness, CP, palpitations, SOB, cough, N/V. ROS otherwise negative.    VITAL SIGNS:  T(F): 97 (22 @ 05:06)  HR: 81 (22 @ 05:06)  BP: 120/85 (22 @ 05:06)  RR: 19 (22 @ 05:06)  SpO2: 96% (22 05:06)  Wt(kg): --      PHYSICAL EXAM:    Constitutional: resting comfortably in bed; NAD  HEENT: NC/AT, PER, anicteric sclera, no nasal discharge; MMM  Neck: supple; no JVD or thyromegaly  Respiratory: CTA B/L; no W/R/R, no retractions  Cardiac: +S1/S2; RRR; no M/R/G  Gastrointestinal: soft, NT/ND; no rebound or guarding  Back: spine midline, no bony tenderness or step-offs; no CVAT B/L  Extremities: WWP, no clubbing or cyanosis; no peripheral edema  Musculoskeletal: NROM x4; no joint swelling, tenderness or erythema  Vascular: 2+ radial, DP/PT pulses B/L  Dermatologic: skin warm, dry and intact; no rashes, wounds, or scars  Neurologic: AAOx3; CNII-XII grossly intact; no focal deficits  Psychiatric: affect and characteristics of appearance, verbalizations, behaviors are appropriate    MEDICATIONS  (STANDING):  aspirin  chewable 81 milliGRAM(s) Oral daily  atorvastatin 80 milliGRAM(s) Oral at bedtime  enoxaparin Injectable 80 milliGRAM(s) SubCutaneous every 12 hours    MEDICATIONS  (PRN):  morphine  - Injectable 2 milliGRAM(s) IV Push every 6 hours PRN Severe Pain (7 - 10)  oxyCODONE    IR 5 milliGRAM(s) Oral every 6 hours PRN Moderate Pain (4 - 6)    LABS:                         12.3   13.39 )-----------( 439      ( 24 Aug 2022 05:30 )             37.6     08-24    137  |  99  |  9   ----------------------------<  98  4.4   |  28  |  0.77    Ca    9.0      24 Aug 2022 05:30  Phos  3.5     08-24  Mg     2.2     08-24    TPro  6.9  /  Alb  2.7<L>  /  TBili  0.5  /  DBili  x   /  AST  37  /  ALT  64<H>  /  AlkPhos  206<H>  08-24    PT/INR - ( 23 Aug 2022 19:00 )   PT: 14.6 sec;   INR: 1.22          PTT - ( 23 Aug 2022 19:00 )  PTT:43.5 sec  Urinalysis Basic - ( 24 Aug 2022 19:44 )    Color: Yellow / Appearance: Clear / S.015 / pH: x  Gluc: x / Ketone: NEGATIVE  / Bili: Negative / Urobili: 0.2 E.U./dL   Blood: x / Protein: NEGATIVE mg/dL / Nitrite: NEGATIVE   Leuk Esterase: NEGATIVE / RBC: x / WBC x   Sq Epi: x / Non Sq Epi: x / Bacteria: x      CARDIAC MARKERS ( 23 Aug 2022 19:00 )  x     / 0.01 ng/mL / x     / x     / x                RADIOLOGY, EKG & ADDITIONAL TESTS: Reviewed.

## 2022-08-25 NOTE — DISCHARGE NOTE NURSING/CASE MANAGEMENT/SOCIAL WORK - PATIENT PORTAL LINK FT
You can access the FollowMyHealth Patient Portal offered by [facility] by registering at the following website: [email or web address]. By joining [facility]’s FollowMyHealth portal, you will also be able to view your health information using other applications (apps) compatible with our system.

## 2022-08-25 NOTE — PROGRESS NOTE ADULT - PROBLEM SELECTOR PLAN 2
On admission WBC 13.8, mildly improved to 13.39, and previously tachycardic to 107, now 98.  2/4 SIRS criteria. Unclear source, may be secondary to portal vein thrombosis.    Plan:  - f/u CXR  - f/u urinalysis  - f/u blood cultures Resolved. CXR and UA with no acute pathology.    On admission WBC 13.8, mildly improved to 13.39, and previously tachycardic to 107, now 98.  2/4 SIRS criteria. Unclear source, may be secondary to portal vein thrombosis.    Plan:  - f/u blood cultures, NGTD

## 2022-08-26 ENCOUNTER — NON-APPOINTMENT (OUTPATIENT)
Age: 57
End: 2022-08-26

## 2022-08-26 ENCOUNTER — APPOINTMENT (OUTPATIENT)
Dept: INTERNAL MEDICINE | Facility: CLINIC | Age: 57
End: 2022-08-26

## 2022-08-26 VITALS
TEMPERATURE: 97.9 F | OXYGEN SATURATION: 98 % | HEIGHT: 70 IN | BODY MASS INDEX: 24.77 KG/M2 | SYSTOLIC BLOOD PRESSURE: 113 MMHG | HEART RATE: 96 BPM | DIASTOLIC BLOOD PRESSURE: 78 MMHG | WEIGHT: 173 LBS

## 2022-08-26 LAB
CONFIRM APTT STACLOT: NEGATIVE — SIGNIFICANT CHANGE UP
DRVVT SCREEN TO CONFIRM RATIO: SIGNIFICANT CHANGE UP
LA NT DPL PPP QL: 39.6 SEC — SIGNIFICANT CHANGE UP

## 2022-08-26 PROCEDURE — 99496 TRANSJ CARE MGMT HIGH F2F 7D: CPT

## 2022-08-26 RX ORDER — ASPIRIN 81 MG
81 TABLET, DELAYED RELEASE (ENTERIC COATED) ORAL DAILY
Refills: 0 | Status: DISCONTINUED | COMMUNITY
End: 2022-08-26

## 2022-08-26 RX ORDER — APIXABAN 2.5 MG/1
2 TABLET, FILM COATED ORAL
Qty: 65 | Refills: 0
Start: 2022-08-26 | End: 2022-08-30

## 2022-08-26 RX ORDER — APIXABAN 2.5 MG/1
2 TABLET, FILM COATED ORAL
Qty: 12 | Refills: 0
Start: 2022-08-26 | End: 2022-08-28

## 2022-08-27 PROBLEM — I25.10 ATHEROSCLEROTIC HEART DISEASE OF NATIVE CORONARY ARTERY WITHOUT ANGINA PECTORIS: Chronic | Status: ACTIVE | Noted: 2022-08-23

## 2022-08-28 ENCOUNTER — NON-APPOINTMENT (OUTPATIENT)
Age: 57
End: 2022-08-28

## 2022-08-29 LAB
CULTURE RESULTS: SIGNIFICANT CHANGE UP
CULTURE RESULTS: SIGNIFICANT CHANGE UP
SPECIMEN SOURCE: SIGNIFICANT CHANGE UP
SPECIMEN SOURCE: SIGNIFICANT CHANGE UP

## 2022-08-29 RX ORDER — APIXABAN 2.5 MG/1
1 TABLET, FILM COATED ORAL
Qty: 60 | Refills: 1
Start: 2022-08-29 | End: 2022-10-27

## 2022-08-29 RX ORDER — APIXABAN 2.5 MG/1
1 TABLET, FILM COATED ORAL
Qty: 30 | Refills: 1
Start: 2022-08-29 | End: 2022-10-27

## 2022-08-30 DIAGNOSIS — Z95.5 PRESENCE OF CORONARY ANGIOPLASTY IMPLANT AND GRAFT: ICD-10-CM

## 2022-08-30 DIAGNOSIS — R65.10 SYSTEMIC INFLAMMATORY RESPONSE SYNDROME (SIRS) OF NON-INFECTIOUS ORIGIN WITHOUT ACUTE ORGAN DYSFUNCTION: ICD-10-CM

## 2022-08-30 DIAGNOSIS — R74.01 ELEVATION OF LEVELS OF LIVER TRANSAMINASE LEVELS: ICD-10-CM

## 2022-08-30 DIAGNOSIS — Z86.16 PERSONAL HISTORY OF COVID-19: ICD-10-CM

## 2022-08-30 DIAGNOSIS — I81 PORTAL VEIN THROMBOSIS: ICD-10-CM

## 2022-08-30 DIAGNOSIS — I25.10 ATHEROSCLEROTIC HEART DISEASE OF NATIVE CORONARY ARTERY WITHOUT ANGINA PECTORIS: ICD-10-CM

## 2022-08-30 DIAGNOSIS — Z87.891 PERSONAL HISTORY OF NICOTINE DEPENDENCE: ICD-10-CM

## 2022-08-30 DIAGNOSIS — Z79.82 LONG TERM (CURRENT) USE OF ASPIRIN: ICD-10-CM

## 2022-08-30 DIAGNOSIS — D68.59 OTHER PRIMARY THROMBOPHILIA: ICD-10-CM

## 2022-08-30 DIAGNOSIS — Z86.19 PERSONAL HISTORY OF OTHER INFECTIOUS AND PARASITIC DISEASES: ICD-10-CM

## 2022-09-06 ENCOUNTER — APPOINTMENT (OUTPATIENT)
Dept: INTERNAL MEDICINE | Facility: CLINIC | Age: 57
End: 2022-09-06

## 2022-09-19 ENCOUNTER — LABORATORY RESULT (OUTPATIENT)
Age: 57
End: 2022-09-19

## 2022-09-19 ENCOUNTER — APPOINTMENT (OUTPATIENT)
Dept: HEMATOLOGY ONCOLOGY | Facility: CLINIC | Age: 57
End: 2022-09-19

## 2022-09-19 VITALS
HEART RATE: 93 BPM | BODY MASS INDEX: 23.48 KG/M2 | SYSTOLIC BLOOD PRESSURE: 132 MMHG | WEIGHT: 164 LBS | TEMPERATURE: 97.1 F | HEIGHT: 70 IN | OXYGEN SATURATION: 97 % | DIASTOLIC BLOOD PRESSURE: 78 MMHG

## 2022-09-19 DIAGNOSIS — Z86.010 PERSONAL HISTORY OF COLONIC POLYPS: ICD-10-CM

## 2022-09-19 DIAGNOSIS — D72.829 ELEVATED WHITE BLOOD CELL COUNT, UNSPECIFIED: ICD-10-CM

## 2022-09-19 DIAGNOSIS — D75.839 THROMBOCYTOSIS, UNSPECIFIED: ICD-10-CM

## 2022-09-19 DIAGNOSIS — Z78.9 OTHER SPECIFIED HEALTH STATUS: ICD-10-CM

## 2022-09-19 DIAGNOSIS — D64.9 ANEMIA, UNSPECIFIED: ICD-10-CM

## 2022-09-19 LAB
BASOPHILS # BLD AUTO: 0.05 K/UL
BASOPHILS NFR BLD AUTO: 0.6 %
EOSINOPHIL # BLD AUTO: 0.09 K/UL
EOSINOPHIL NFR BLD AUTO: 1 %
HCT VFR BLD CALC: 44.9 %
HGB BLD-MCNC: 14.2 G/DL
IMM GRANULOCYTES NFR BLD AUTO: 0.3 %
LYMPHOCYTES # BLD AUTO: 2.1 K/UL
LYMPHOCYTES NFR BLD AUTO: 23.3 %
MAN DIFF?: NORMAL
MCHC RBC-ENTMCNC: 27.3 PG
MCHC RBC-ENTMCNC: 31.6 GM/DL
MCV RBC AUTO: 86.3 FL
MONOCYTES # BLD AUTO: 0.82 K/UL
MONOCYTES NFR BLD AUTO: 9.1 %
NEUTROPHILS # BLD AUTO: 5.91 K/UL
NEUTROPHILS NFR BLD AUTO: 65.7 %
PLATELET # BLD AUTO: 324 K/UL
RBC # BLD: 5.2 M/UL
RBC # BLD: 5.2 M/UL
RBC # FLD: 14.7 %
RETICS # AUTO: 1.6 %
RETICS AGGREG/RBC NFR: 81.1 K/UL
WBC # FLD AUTO: 9 K/UL

## 2022-09-19 PROCEDURE — 36415 COLL VENOUS BLD VENIPUNCTURE: CPT

## 2022-09-19 PROCEDURE — 99204 OFFICE O/P NEW MOD 45 MIN: CPT | Mod: 25

## 2022-09-19 NOTE — HISTORY OF PRESENT ILLNESS
[de-identified] : Patient is a 57 year old male with a history of coronary artery disease, status post MI and stent insertion in 2018, hypertension, hepatitis B, depression and COVID-19 (July 2022) who is referred for evaluation of hypercoagulable state after developing a portal vein thrombosis in July of 2022. A month after COVID-19, patient developed fever, abdominal pain and chest pain. An outpatient ultrasound evaluation revealed the patient to have portal vein thrombosis for which he was admitted to [facility]. This was confirmed by a CT scan. Patient was also found to have splenomegaly. Patient was initially treated with Lovenox and transitioned to Eliquis. On August 24th, patient was found to have a white blood count of 13.39, hemoglobin of 12.3, hematocrit of 37.6 and a platelet  count of 439,000. Comprehensive metabolic panel was significant for an ALT of 64 and alkaline phosphatase of 206. A lupus anticoagulant screen was negative.  There is no family history of thrombotic events.  Patient reports some abdominal pain and discomfort which has subsided over the past one to two weeks.  Today, he feels he is gradually improving and has no acute complains. Denies fever, joint pain, bleeding from nose or mouth, bruising excessively, rashes , chills, night sweats, chest pain, or shortness of breath .

## 2022-09-19 NOTE — PHYSICAL EXAM
[Normal] : affect appropriate [de-identified] : Normal bowel sounds, soft, nontender, no masses, palpable spleen tip on inspiration [de-identified] : Multiple tattoos, no ecchymoses or petechiae

## 2022-09-19 NOTE — ASSESSMENT
[FreeTextEntry1] : Patient is a 57 year old male with a history of coronary artery disease status post MI and stent insertion in 2018, hypertension, hepatitis B, depression and COVID-19(July 2022) who is referred for evaluation of hypercoagulable state after developing a portal vein thrombosis in July of 2022.  Possible etiologies include an acquired hypercoagulable state (antiphospholipid antibodies), genetic predisposition (factor V Leiden mutation, prothrombin gene mutation) protein C, S or Antithrombin III deficiency.  The patient's leukocytosis, anemia, and thrombocytosis and unusual thrombotic event (portal vein thrombosis) raise the strong possibility of a myeloproliferative disorder i.e. JAK2 positive polycythemia vera or other. Have ordered iron panel, B12 and folate, anticardiolipin antibody level, anticardiolipin(IgA, IgG,IgM), antithrombin III assay, protein C&S levels, BCR/ABL quantitative, Beta 2 glycoprotein (IgA,IgG,IgM),  CBC, CMP, CRP, factor V Leiden, Prothrombin Gene Mutation,CALR mutation, MPL mutation, routine electrophoresis and Immunofixation. Venipuncture was performed in the office today. Patient was advised to make a follow-up appointment to discuss results and further recommendations.

## 2022-09-19 NOTE — CONSULT LETTER
[Dear  ___] : Dear  [unfilled], [Consult Letter:] : I had the pleasure of evaluating your patient, [unfilled]. [( Thank you for referring [unfilled] for consultation for _____ )] : Thank you for referring [unfilled] for consultation for [unfilled] [Please see my note below.] : Please see my note below. [Consult Closing:] : Thank you very much for allowing me to participate in the care of this patient.  If you have any questions, please do not hesitate to contact me. [Sincerely,] : Sincerely, [FreeTextEntry3] : Catherine Paredes

## 2022-09-19 NOTE — REVIEW OF SYSTEMS
[Patient Intake Form Reviewed] : Patient intake form was reviewed [Fatigue] : fatigue [Negative] : Allergic/Immunologic [Fever] : no fever [Chills] : no chills [Night Sweats] : no night sweats [Recent Change In Weight] : ~T no recent weight change [Chest Pain] : no chest pain [Palpitations] : no palpitations [Shortness Of Breath] : no shortness of breath [Joint Pain] : no joint pain [Joint Stiffness] : no joint stiffness [Skin Rash] : no skin rash [Easy Bleeding] : no tendency for easy bleeding [Easy Bruising] : no tendency for easy bruising [FreeTextEntry7] : Mild intermittent abdominal discomfort

## 2022-09-19 NOTE — REASON FOR VISIT
[Initial Consultation] : an initial consultation for [FreeTextEntry2] : Anemia, leukocytosis, thrombocytosis, portal vein thrombosis, splenomegaly, fatigue

## 2022-09-20 LAB
ALBUMIN SERPL ELPH-MCNC: 4.3 G/DL
ALP BLD-CCNC: 155 U/L
ALT SERPL-CCNC: 49 U/L
ANION GAP SERPL CALC-SCNC: 14 MMOL/L
AST SERPL-CCNC: 33 U/L
AT III PPP CHRO-ACNC: 133 %
BILIRUB SERPL-MCNC: 0.3 MG/DL
BUN SERPL-MCNC: 11 MG/DL
CALCIUM SERPL-MCNC: 9.4 MG/DL
CHLORIDE SERPL-SCNC: 102 MMOL/L
CO2 SERPL-SCNC: 25 MMOL/L
CREAT SERPL-MCNC: 0.67 MG/DL
CRP SERPL-MCNC: <3 MG/L
EGFR: 108 ML/MIN/1.73M2
ERYTHROCYTE [SEDIMENTATION RATE] IN BLOOD BY WESTERGREN METHOD: 11 MM/HR
GLUCOSE SERPL-MCNC: 96 MG/DL
POTASSIUM SERPL-SCNC: 4.4 MMOL/L
PROT C PPP CHRO-ACNC: 136 %
PROT S AG ACT/NOR PPP IA: 102 %
PROT SERPL-MCNC: 7.3 G/DL
SODIUM SERPL-SCNC: 141 MMOL/L

## 2022-09-21 LAB
B2 GLYCOPROT1 IGA SERPL IA-ACNC: 5.5 SAU
B2 GLYCOPROT1 IGG SER-ACNC: <5 SGU
B2 GLYCOPROT1 IGM SER-ACNC: 5.4 SMU
CARDIOLIPIN AB SER IA-ACNC: NEGATIVE
CARDIOLIPIN IGM SER-MCNC: 13.6 MPL
CARDIOLIPIN IGM SER-MCNC: <5 GPL
DEPRECATED CARDIOLIPIN IGA SER: <5 APL
MPL EXON 10 MUTATION: NORMAL
PROT S FREE PPP-ACNC: 128 %

## 2022-09-22 LAB
DNA PLOIDY SPEC FC-IMP: NORMAL
FERRITIN SERPL-MCNC: 204 NG/ML
FOLATE SERPL-MCNC: 12.5 NG/ML
IRON SATN MFR SERPL: 21 %
IRON SERPL-MCNC: 72 UG/DL
PTR INTERP: NORMAL
TIBC SERPL-MCNC: 343 UG/DL
UIBC SERPL-MCNC: 271 UG/DL
VIT B12 SERPL-MCNC: 524 PG/ML

## 2022-09-23 LAB
ALBUMIN MFR SERPL ELPH: 48.5 %
ALBUMIN SERPL-MCNC: 3.6 G/DL
ALBUMIN/GLOB SERPL: 0.9 RATIO
ALPHA1 GLOB MFR SERPL ELPH: 5.6 %
ALPHA1 GLOB SERPL ELPH-MCNC: 0.4 G/DL
ALPHA2 GLOB MFR SERPL ELPH: 12.2 %
ALPHA2 GLOB SERPL ELPH-MCNC: 0.9 G/DL
B-GLOBULIN MFR SERPL ELPH: 16.4 %
B-GLOBULIN SERPL ELPH-MCNC: 1.2 G/DL
GAMMA GLOB FLD ELPH-MCNC: 1.3 G/DL
GAMMA GLOB MFR SERPL ELPH: 17.3 %
INTERPRETATION SERPL IEP-IMP: NORMAL
M PROTEIN SPEC IFE-MCNC: NORMAL
PROT SERPL-MCNC: 7.5 G/DL
PROT SERPL-MCNC: 7.5 G/DL
T(9;22)(ABL1,BCR)/CONTROL BLD/T: NORMAL

## 2022-09-26 LAB — GENE XXX MUT ANL BLD/T: NORMAL

## 2022-10-02 LAB
EXTRACTION: NORMAL
JAK2 P.V617F BLD/T QL: NORMAL
LAB DIRECTOR NAME PROVIDER: NORMAL
LABORATORY COMMENT REPORT: NORMAL
REFLEX:: NORMAL

## 2022-10-04 ENCOUNTER — APPOINTMENT (OUTPATIENT)
Age: 57
End: 2022-10-04

## 2022-10-04 VITALS
TEMPERATURE: 97.1 F | WEIGHT: 163 LBS | SYSTOLIC BLOOD PRESSURE: 125 MMHG | OXYGEN SATURATION: 98 % | HEART RATE: 86 BPM | BODY MASS INDEX: 23.34 KG/M2 | HEIGHT: 70 IN | DIASTOLIC BLOOD PRESSURE: 80 MMHG | RESPIRATION RATE: 14 BRPM

## 2022-10-04 DIAGNOSIS — K63.9 DISEASE OF INTESTINE, UNSPECIFIED: ICD-10-CM

## 2022-10-04 PROCEDURE — 99204 OFFICE O/P NEW MOD 45 MIN: CPT

## 2022-10-04 NOTE — HISTORY OF PRESENT ILLNESS
[FreeTextEntry1] : Patient is a 57 year old male with a history of coronary artery disease, status post MI and stent insertion in 2018, portal vein thrombosis on eliquis, hypertension, hepatitis B, depression and COVID-19 (July 2022) who is referred for CT scan findings. \par \par CT scan: 8/24/22\par -Colonic diverticulosis with wall thickening of small sigmoid colon likely due to muscular hypertrophy. No significant pericolic inflammation or fluid collection. \par -Enlarged spleen 15.7 cm in height. \par - Small fat containing umbilical hernia \par - Thrombosis of left portal vein\par \par Patient states that he had COVID in July, mild symptoms at the time. A month later, he had severe fatigue, headaches, and felt like his abdomen was "inflamed and full." He went to the ER, told he had a respiratory virus and discharged. He followed up with his PCP, who then sent him for imaging. He was found to have a PVT, and then followed up with Dr. Paredes, who ran a littany of tests which are pending. He continues to have some vague abdominal fullness, but no diarrhea or constipation. Had colonoscopy 2 years back with Dr. Low with diverticulosis and 2 small polyps.

## 2022-10-04 NOTE — PHYSICAL EXAM
[Alert] : alert [Normal Voice/Communication] : normal voice/communication [Well Developed] : well developed [Sclera] : the sclera and conjunctiva were normal [No Respiratory Distress] : no respiratory distress [No Acc Muscle Use] : no accessory muscle use [Respiration, Rhythm And Depth] : normal respiratory rhythm and effort [Heart Rate And Rhythm] : heart rate was normal and rhythm regular [Abdomen Tenderness] : non-tender [Abdomen Soft] : soft [] : no rash [No Focal Deficits] : no focal deficits [Oriented To Time, Place, And Person] : oriented to person, place, and time

## 2022-10-04 NOTE — ASSESSMENT
[FreeTextEntry1] : Patient is a 57 year old male with a history of coronary artery disease, status post MI and stent insertion in 2018, portal vein thrombosis on eliquis, hypertension, hepatitis B, depression and COVID-19 (July 2022) who is referred for CT scan findings. \par \par #CT findings of colonic wall thickening\par - most likely i/s/o diverticulosis, but no clinical symptoms and does not appear consistent with SCAD\par - prior colonoscopy noted, plan for rpt in 2025\par \par #PVT\par - h/o hep B, but now immune\par - most likely related to COVID inflammatory cascade, but hypercoagulable w/u ongoing with Dr. Paredes\par \par RTC PRN. otherwise will plan for next visit at time of colonosocpy \par \par Sendy Vitale MD\par PGY-6, Gastroenterology Fellow

## 2022-10-07 ENCOUNTER — APPOINTMENT (OUTPATIENT)
Dept: INTERNAL MEDICINE | Facility: CLINIC | Age: 57
End: 2022-10-07

## 2022-10-07 VITALS
HEIGHT: 70 IN | DIASTOLIC BLOOD PRESSURE: 83 MMHG | SYSTOLIC BLOOD PRESSURE: 121 MMHG | WEIGHT: 170 LBS | BODY MASS INDEX: 24.34 KG/M2 | TEMPERATURE: 97.2 F | HEART RATE: 82 BPM | OXYGEN SATURATION: 96 %

## 2022-10-07 DIAGNOSIS — Z23 ENCOUNTER FOR IMMUNIZATION: ICD-10-CM

## 2022-10-07 PROCEDURE — 90686 IIV4 VACC NO PRSV 0.5 ML IM: CPT

## 2022-10-07 PROCEDURE — 99213 OFFICE O/P EST LOW 20 MIN: CPT | Mod: 25

## 2022-10-07 PROCEDURE — G0008: CPT

## 2022-10-11 ENCOUNTER — APPOINTMENT (OUTPATIENT)
Dept: HEMATOLOGY ONCOLOGY | Facility: CLINIC | Age: 57
End: 2022-10-11

## 2022-10-11 VITALS
WEIGHT: 175 LBS | DIASTOLIC BLOOD PRESSURE: 68 MMHG | OXYGEN SATURATION: 97 % | TEMPERATURE: 97.8 F | BODY MASS INDEX: 25.05 KG/M2 | SYSTOLIC BLOOD PRESSURE: 116 MMHG | HEIGHT: 70 IN | HEART RATE: 88 BPM

## 2022-10-11 DIAGNOSIS — I81 PORTAL VEIN THROMBOSIS: ICD-10-CM

## 2022-10-11 DIAGNOSIS — R53.83 OTHER FATIGUE: ICD-10-CM

## 2022-10-11 DIAGNOSIS — R16.1 SPLENOMEGALY, NOT ELSEWHERE CLASSIFIED: ICD-10-CM

## 2022-10-11 DIAGNOSIS — D68.52 PROTHROMBIN GENE MUTATION: ICD-10-CM

## 2022-10-11 PROCEDURE — 99212 OFFICE O/P EST SF 10 MIN: CPT

## 2022-10-11 NOTE — ASSESSMENT
[FreeTextEntry1] : \par Patient is a 57 year old male with a history of coronary artery disease, status post MI and stent insertion in 2018, hypertension, hepatitis B, depression and COVID-19 (July 2022) who was referred for evaluation of hypercoagulable state after developing a portal vein thrombosis in July of 2022 returns now for discussion of results.  Extensive testing reveals that he is heterozygous for the prothrombin gene mutation and was positive for cardiolipin IgM antibody in the indeterminate range.  This antibody will need to be repeated in 8 to 12 weeks.  Due to the unusual location of the thrombosis, underlying prothrombin gene mutation and possible anticardiolipin antibody, it would be prudent to remain on long-term anticoagulation.  It is also possible that having had COVID-19 infection within the month prior contributed to the thrombotic event.  The patient thus far has tolerated Eliquis  without any adverse effects.  He will return in 2 to 3 months as advised.

## 2022-10-11 NOTE — HISTORY OF PRESENT ILLNESS
[de-identified] : \par Patient is a 57 year old male with a history of coronary artery disease, status post MI and stent insertion in 2018, hypertension, hepatitis B, depression and COVID-19 (July 2022) who was referred for evaluation of hypercoagulable state after developing a portal vein thrombosis in July of 2022 returns now for discussion of results.  Prior to the time of the initial consult the patient had a leukocytosis, mild anemia and a thrombocytosis.  These abnormalities have resolved as the CBC was significant for a white blood count of 9.0, hemoglobin of 14.2 and a platelet count of 324,000.  It is likely that the prior abnormalities were reactive to the inflammation caused by the portal vein thrombosis.  CMP was remarkable for slightly elevated ALT and alkaline phosphatase.  An extensive evaluation for hypercoagulability revealed a heterozygous prothrombin gene mutation and an indeterminant anticardiolipin IgM antibody.  The remainder of the testing was normal or negative including JAK2 mutations, BCR/ABL mutation, CALR mutation, MPL mutation, protein C&S panel, Antithrombin III level, factor V Leiden mutation.  Protein electrophoresis, iron panel, ferritin, B12 and folate levels were normal.  The patient remains on Eliquis which he tolerates well.  He denies bleeding from nose or mouth, blood in urine or stool, excessive bruising or other bleeding manifestations.  He also denies abdominal pain, fever, chills, or drenching sweats.

## 2022-10-11 NOTE — REASON FOR VISIT
[Follow-Up Visit] : a follow-up visit for [FreeTextEntry2] : History of portal vein thrombosis, prothrombin gene mutation, positive cardiolipin antibody

## 2022-10-24 ENCOUNTER — NON-APPOINTMENT (OUTPATIENT)
Age: 57
End: 2022-10-24

## 2022-11-10 ENCOUNTER — NON-APPOINTMENT (OUTPATIENT)
Age: 57
End: 2022-11-10

## 2022-12-18 ENCOUNTER — RX RENEWAL (OUTPATIENT)
Age: 57
End: 2022-12-18

## 2023-01-19 ENCOUNTER — APPOINTMENT (OUTPATIENT)
Dept: INTERNAL MEDICINE | Facility: CLINIC | Age: 58
End: 2023-01-19
Payer: COMMERCIAL

## 2023-01-19 DIAGNOSIS — J02.9 ACUTE PHARYNGITIS, UNSPECIFIED: ICD-10-CM

## 2023-01-19 PROCEDURE — 99213 OFFICE O/P EST LOW 20 MIN: CPT | Mod: 95

## 2023-01-24 NOTE — ED ADULT NURSE NOTE - [facility], ML[facility]
[Dear  ___] : Dear  [unfilled], [Consult Letter:] : I had the pleasure of evaluating your patient, [unfilled]. [Please see my note below.] : Please see my note below. [Consult Closing:] : Thank you very much for allowing me to participate in the care of this patient.  If you have any questions, please do not hesitate to contact me. Patent [Sincerely,] : Sincerely, [FreeTextEntry2] : Tevin Alfaro MD

## 2023-01-29 DIAGNOSIS — U07.1 COVID-19: ICD-10-CM

## 2023-01-30 ENCOUNTER — NON-APPOINTMENT (OUTPATIENT)
Age: 58
End: 2023-01-30

## 2023-03-02 ENCOUNTER — APPOINTMENT (OUTPATIENT)
Dept: INTERNAL MEDICINE | Facility: CLINIC | Age: 58
End: 2023-03-02
Payer: COMMERCIAL

## 2023-03-02 ENCOUNTER — NON-APPOINTMENT (OUTPATIENT)
Age: 58
End: 2023-03-02

## 2023-03-02 DIAGNOSIS — L30.9 DERMATITIS, UNSPECIFIED: ICD-10-CM

## 2023-03-02 PROCEDURE — 99213 OFFICE O/P EST LOW 20 MIN: CPT | Mod: 95

## 2023-03-02 RX ORDER — MOMETASONE FUROATE 1 MG/G
0.1 CREAM TOPICAL
Qty: 1 | Refills: 0 | Status: ACTIVE | COMMUNITY
Start: 2021-04-22 | End: 1900-01-01

## 2023-03-19 ENCOUNTER — RX RENEWAL (OUTPATIENT)
Age: 58
End: 2023-03-19

## 2023-04-09 ENCOUNTER — RX RENEWAL (OUTPATIENT)
Age: 58
End: 2023-04-09

## 2023-06-17 ENCOUNTER — RX RENEWAL (OUTPATIENT)
Age: 58
End: 2023-06-17

## 2023-06-22 ENCOUNTER — APPOINTMENT (OUTPATIENT)
Dept: INTERNAL MEDICINE | Facility: CLINIC | Age: 58
End: 2023-06-22
Payer: COMMERCIAL

## 2023-06-22 VITALS
OXYGEN SATURATION: 97 % | HEART RATE: 84 BPM | HEIGHT: 70 IN | TEMPERATURE: 97.5 F | WEIGHT: 172 LBS | SYSTOLIC BLOOD PRESSURE: 116 MMHG | DIASTOLIC BLOOD PRESSURE: 80 MMHG | BODY MASS INDEX: 24.62 KG/M2

## 2023-06-22 DIAGNOSIS — F41.9 ANXIETY DISORDER, UNSPECIFIED: ICD-10-CM

## 2023-06-22 DIAGNOSIS — J32.9 CHRONIC SINUSITIS, UNSPECIFIED: ICD-10-CM

## 2023-06-22 DIAGNOSIS — E78.5 HYPERLIPIDEMIA, UNSPECIFIED: ICD-10-CM

## 2023-06-22 DIAGNOSIS — R05.9 COUGH, UNSPECIFIED: ICD-10-CM

## 2023-06-22 DIAGNOSIS — R76.0 RAISED ANTIBODY TITER: ICD-10-CM

## 2023-06-22 PROCEDURE — 99215 OFFICE O/P EST HI 40 MIN: CPT | Mod: 25

## 2023-06-22 PROCEDURE — 36415 COLL VENOUS BLD VENIPUNCTURE: CPT

## 2023-06-22 RX ORDER — ALBUTEROL SULFATE 90 UG/1
108 (90 BASE) AEROSOL, METERED RESPIRATORY (INHALATION) EVERY 4 HOURS
Qty: 1 | Refills: 0 | Status: ACTIVE | COMMUNITY
Start: 2023-03-02 | End: 1900-01-01

## 2023-06-23 LAB
ALBUMIN SERPL ELPH-MCNC: 4.6 G/DL
ALP BLD-CCNC: 118 U/L
ALT SERPL-CCNC: 23 U/L
ANION GAP SERPL CALC-SCNC: 18 MMOL/L
AST SERPL-CCNC: 21 U/L
BILIRUB SERPL-MCNC: 0.6 MG/DL
BUN SERPL-MCNC: 16 MG/DL
CALCIUM SERPL-MCNC: 9.7 MG/DL
CHLORIDE SERPL-SCNC: 100 MMOL/L
CHOLEST SERPL-MCNC: 139 MG/DL
CO2 SERPL-SCNC: 25 MMOL/L
CREAT SERPL-MCNC: 0.74 MG/DL
EGFR: 104 ML/MIN/1.73M2
GLUCOSE SERPL-MCNC: 84 MG/DL
HDLC SERPL-MCNC: 60 MG/DL
LDLC SERPL CALC-MCNC: 62 MG/DL
NONHDLC SERPL-MCNC: 80 MG/DL
POTASSIUM SERPL-SCNC: 4.4 MMOL/L
PROT SERPL-MCNC: 7.3 G/DL
SODIUM SERPL-SCNC: 143 MMOL/L
TRIGL SERPL-MCNC: 88 MG/DL

## 2023-06-25 LAB — CARDIOLIPIN AB SER IA-ACNC: NEGATIVE

## 2023-07-25 ENCOUNTER — RX RENEWAL (OUTPATIENT)
Age: 58
End: 2023-07-25

## 2023-09-27 ENCOUNTER — RX RENEWAL (OUTPATIENT)
Age: 58
End: 2023-09-27

## 2023-09-27 RX ORDER — MONTELUKAST 10 MG/1
10 TABLET, FILM COATED ORAL
Qty: 90 | Refills: 0 | Status: ACTIVE | COMMUNITY
Start: 2023-06-22 | End: 1900-01-01

## 2023-10-17 ENCOUNTER — INPATIENT (INPATIENT)
Facility: HOSPITAL | Age: 58
LOS: 1 days | Discharge: HOME | DRG: 392 | End: 2023-10-19
Attending: SURGERY | Admitting: SURGERY
Payer: COMMERCIAL

## 2023-10-17 ENCOUNTER — APPOINTMENT (OUTPATIENT)
Dept: INTERNAL MEDICINE | Facility: CLINIC | Age: 58
End: 2023-10-17
Payer: COMMERCIAL

## 2023-10-17 VITALS
DIASTOLIC BLOOD PRESSURE: 78 MMHG | TEMPERATURE: 97.3 F | HEIGHT: 70 IN | HEART RATE: 88 BPM | WEIGHT: 173 LBS | SYSTOLIC BLOOD PRESSURE: 122 MMHG | OXYGEN SATURATION: 98 % | BODY MASS INDEX: 24.77 KG/M2

## 2023-10-17 VITALS
DIASTOLIC BLOOD PRESSURE: 79 MMHG | HEART RATE: 92 BPM | OXYGEN SATURATION: 97 % | SYSTOLIC BLOOD PRESSURE: 115 MMHG | TEMPERATURE: 98 F | RESPIRATION RATE: 16 BRPM

## 2023-10-17 DIAGNOSIS — R10.9 UNSPECIFIED ABDOMINAL PAIN: ICD-10-CM

## 2023-10-17 LAB
ALBUMIN SERPL ELPH-MCNC: 3.7 G/DL — SIGNIFICANT CHANGE UP (ref 3.3–5)
ALBUMIN SERPL ELPH-MCNC: 3.7 G/DL — SIGNIFICANT CHANGE UP (ref 3.3–5)
ALP SERPL-CCNC: 95 U/L — SIGNIFICANT CHANGE UP (ref 40–120)
ALP SERPL-CCNC: 95 U/L — SIGNIFICANT CHANGE UP (ref 40–120)
ALT FLD-CCNC: 21 U/L — SIGNIFICANT CHANGE UP (ref 10–45)
ALT FLD-CCNC: 21 U/L — SIGNIFICANT CHANGE UP (ref 10–45)
ANION GAP SERPL CALC-SCNC: 12 MMOL/L — SIGNIFICANT CHANGE UP (ref 5–17)
ANION GAP SERPL CALC-SCNC: 12 MMOL/L — SIGNIFICANT CHANGE UP (ref 5–17)
APPEARANCE UR: CLEAR — SIGNIFICANT CHANGE UP
APPEARANCE UR: CLEAR — SIGNIFICANT CHANGE UP
APTT BLD: 34.6 SEC — SIGNIFICANT CHANGE UP (ref 24.5–35.6)
APTT BLD: 34.6 SEC — SIGNIFICANT CHANGE UP (ref 24.5–35.6)
AST SERPL-CCNC: 16 U/L — SIGNIFICANT CHANGE UP (ref 10–40)
AST SERPL-CCNC: 16 U/L — SIGNIFICANT CHANGE UP (ref 10–40)
BACTERIA # UR AUTO: PRESENT /HPF
BACTERIA # UR AUTO: PRESENT /HPF
BASOPHILS # BLD AUTO: 0.02 K/UL — SIGNIFICANT CHANGE UP (ref 0–0.2)
BASOPHILS # BLD AUTO: 0.02 K/UL — SIGNIFICANT CHANGE UP (ref 0–0.2)
BASOPHILS NFR BLD AUTO: 0.1 % — SIGNIFICANT CHANGE UP (ref 0–2)
BASOPHILS NFR BLD AUTO: 0.1 % — SIGNIFICANT CHANGE UP (ref 0–2)
BILIRUB SERPL-MCNC: 0.7 MG/DL — SIGNIFICANT CHANGE UP (ref 0.2–1.2)
BILIRUB SERPL-MCNC: 0.7 MG/DL — SIGNIFICANT CHANGE UP (ref 0.2–1.2)
BILIRUB UR-MCNC: NEGATIVE — SIGNIFICANT CHANGE UP
BILIRUB UR-MCNC: NEGATIVE — SIGNIFICANT CHANGE UP
BUN SERPL-MCNC: 12 MG/DL — SIGNIFICANT CHANGE UP (ref 7–23)
BUN SERPL-MCNC: 12 MG/DL — SIGNIFICANT CHANGE UP (ref 7–23)
CALCIUM SERPL-MCNC: 9 MG/DL — SIGNIFICANT CHANGE UP (ref 8.4–10.5)
CALCIUM SERPL-MCNC: 9 MG/DL — SIGNIFICANT CHANGE UP (ref 8.4–10.5)
CHLORIDE SERPL-SCNC: 101 MMOL/L — SIGNIFICANT CHANGE UP (ref 96–108)
CHLORIDE SERPL-SCNC: 101 MMOL/L — SIGNIFICANT CHANGE UP (ref 96–108)
CO2 SERPL-SCNC: 28 MMOL/L — SIGNIFICANT CHANGE UP (ref 22–31)
CO2 SERPL-SCNC: 28 MMOL/L — SIGNIFICANT CHANGE UP (ref 22–31)
COLOR SPEC: YELLOW — SIGNIFICANT CHANGE UP
COLOR SPEC: YELLOW — SIGNIFICANT CHANGE UP
COMMENT - URINE: SIGNIFICANT CHANGE UP
COMMENT - URINE: SIGNIFICANT CHANGE UP
CREAT SERPL-MCNC: 0.72 MG/DL — SIGNIFICANT CHANGE UP (ref 0.5–1.3)
CREAT SERPL-MCNC: 0.72 MG/DL — SIGNIFICANT CHANGE UP (ref 0.5–1.3)
DIFF PNL FLD: NEGATIVE — SIGNIFICANT CHANGE UP
DIFF PNL FLD: NEGATIVE — SIGNIFICANT CHANGE UP
EGFR: 105 ML/MIN/1.73M2 — SIGNIFICANT CHANGE UP
EGFR: 105 ML/MIN/1.73M2 — SIGNIFICANT CHANGE UP
EOSINOPHIL # BLD AUTO: 0.06 K/UL — SIGNIFICANT CHANGE UP (ref 0–0.5)
EOSINOPHIL # BLD AUTO: 0.06 K/UL — SIGNIFICANT CHANGE UP (ref 0–0.5)
EOSINOPHIL NFR BLD AUTO: 0.4 % — SIGNIFICANT CHANGE UP (ref 0–6)
EOSINOPHIL NFR BLD AUTO: 0.4 % — SIGNIFICANT CHANGE UP (ref 0–6)
EPI CELLS # UR: SIGNIFICANT CHANGE UP /HPF (ref 0–5)
EPI CELLS # UR: SIGNIFICANT CHANGE UP /HPF (ref 0–5)
GLUCOSE SERPL-MCNC: 101 MG/DL — HIGH (ref 70–99)
GLUCOSE SERPL-MCNC: 101 MG/DL — HIGH (ref 70–99)
GLUCOSE UR QL: NEGATIVE — SIGNIFICANT CHANGE UP
GLUCOSE UR QL: NEGATIVE — SIGNIFICANT CHANGE UP
HCT VFR BLD CALC: 43.7 % — SIGNIFICANT CHANGE UP (ref 39–50)
HCT VFR BLD CALC: 43.7 % — SIGNIFICANT CHANGE UP (ref 39–50)
HGB BLD-MCNC: 14.3 G/DL — SIGNIFICANT CHANGE UP (ref 13–17)
HGB BLD-MCNC: 14.3 G/DL — SIGNIFICANT CHANGE UP (ref 13–17)
IMM GRANULOCYTES NFR BLD AUTO: 0.3 % — SIGNIFICANT CHANGE UP (ref 0–0.9)
IMM GRANULOCYTES NFR BLD AUTO: 0.3 % — SIGNIFICANT CHANGE UP (ref 0–0.9)
KETONES UR-MCNC: 15 MG/DL
KETONES UR-MCNC: 15 MG/DL
LEUKOCYTE ESTERASE UR-ACNC: NEGATIVE — SIGNIFICANT CHANGE UP
LEUKOCYTE ESTERASE UR-ACNC: NEGATIVE — SIGNIFICANT CHANGE UP
LIDOCAIN IGE QN: 14 U/L — SIGNIFICANT CHANGE UP (ref 7–60)
LIDOCAIN IGE QN: 14 U/L — SIGNIFICANT CHANGE UP (ref 7–60)
LYMPHOCYTES # BLD AUTO: 1.22 K/UL — SIGNIFICANT CHANGE UP (ref 1–3.3)
LYMPHOCYTES # BLD AUTO: 1.22 K/UL — SIGNIFICANT CHANGE UP (ref 1–3.3)
LYMPHOCYTES # BLD AUTO: 7.5 % — LOW (ref 13–44)
LYMPHOCYTES # BLD AUTO: 7.5 % — LOW (ref 13–44)
MCHC RBC-ENTMCNC: 28.9 PG — SIGNIFICANT CHANGE UP (ref 27–34)
MCHC RBC-ENTMCNC: 28.9 PG — SIGNIFICANT CHANGE UP (ref 27–34)
MCHC RBC-ENTMCNC: 32.7 GM/DL — SIGNIFICANT CHANGE UP (ref 32–36)
MCHC RBC-ENTMCNC: 32.7 GM/DL — SIGNIFICANT CHANGE UP (ref 32–36)
MCV RBC AUTO: 88.5 FL — SIGNIFICANT CHANGE UP (ref 80–100)
MCV RBC AUTO: 88.5 FL — SIGNIFICANT CHANGE UP (ref 80–100)
MONOCYTES # BLD AUTO: 1.24 K/UL — HIGH (ref 0–0.9)
MONOCYTES # BLD AUTO: 1.24 K/UL — HIGH (ref 0–0.9)
MONOCYTES NFR BLD AUTO: 7.6 % — SIGNIFICANT CHANGE UP (ref 2–14)
MONOCYTES NFR BLD AUTO: 7.6 % — SIGNIFICANT CHANGE UP (ref 2–14)
NEUTROPHILS # BLD AUTO: 13.78 K/UL — HIGH (ref 1.8–7.4)
NEUTROPHILS # BLD AUTO: 13.78 K/UL — HIGH (ref 1.8–7.4)
NEUTROPHILS NFR BLD AUTO: 84.1 % — HIGH (ref 43–77)
NEUTROPHILS NFR BLD AUTO: 84.1 % — HIGH (ref 43–77)
NITRITE UR-MCNC: NEGATIVE — SIGNIFICANT CHANGE UP
NITRITE UR-MCNC: NEGATIVE — SIGNIFICANT CHANGE UP
NRBC # BLD: 0 /100 WBCS — SIGNIFICANT CHANGE UP (ref 0–0)
NRBC # BLD: 0 /100 WBCS — SIGNIFICANT CHANGE UP (ref 0–0)
PH UR: 6.5 — SIGNIFICANT CHANGE UP (ref 5–8)
PH UR: 6.5 — SIGNIFICANT CHANGE UP (ref 5–8)
PLATELET # BLD AUTO: 255 K/UL — SIGNIFICANT CHANGE UP (ref 150–400)
PLATELET # BLD AUTO: 255 K/UL — SIGNIFICANT CHANGE UP (ref 150–400)
POTASSIUM SERPL-MCNC: 4 MMOL/L — SIGNIFICANT CHANGE UP (ref 3.5–5.3)
POTASSIUM SERPL-MCNC: 4 MMOL/L — SIGNIFICANT CHANGE UP (ref 3.5–5.3)
POTASSIUM SERPL-SCNC: 4 MMOL/L — SIGNIFICANT CHANGE UP (ref 3.5–5.3)
POTASSIUM SERPL-SCNC: 4 MMOL/L — SIGNIFICANT CHANGE UP (ref 3.5–5.3)
PROT SERPL-MCNC: 7.3 G/DL — SIGNIFICANT CHANGE UP (ref 6–8.3)
PROT SERPL-MCNC: 7.3 G/DL — SIGNIFICANT CHANGE UP (ref 6–8.3)
PROT UR-MCNC: ABNORMAL MG/DL
PROT UR-MCNC: ABNORMAL MG/DL
RBC # BLD: 4.94 M/UL — SIGNIFICANT CHANGE UP (ref 4.2–5.8)
RBC # BLD: 4.94 M/UL — SIGNIFICANT CHANGE UP (ref 4.2–5.8)
RBC # FLD: 13.3 % — SIGNIFICANT CHANGE UP (ref 10.3–14.5)
RBC # FLD: 13.3 % — SIGNIFICANT CHANGE UP (ref 10.3–14.5)
RBC CASTS # UR COMP ASSIST: < 5 /HPF — SIGNIFICANT CHANGE UP
RBC CASTS # UR COMP ASSIST: < 5 /HPF — SIGNIFICANT CHANGE UP
SODIUM SERPL-SCNC: 141 MMOL/L — SIGNIFICANT CHANGE UP (ref 135–145)
SODIUM SERPL-SCNC: 141 MMOL/L — SIGNIFICANT CHANGE UP (ref 135–145)
SP GR SPEC: 1.01 — SIGNIFICANT CHANGE UP (ref 1–1.03)
SP GR SPEC: 1.01 — SIGNIFICANT CHANGE UP (ref 1–1.03)
UROBILINOGEN FLD QL: 1 E.U./DL — SIGNIFICANT CHANGE UP
UROBILINOGEN FLD QL: 1 E.U./DL — SIGNIFICANT CHANGE UP
WBC # BLD: 16.37 K/UL — HIGH (ref 3.8–10.5)
WBC # BLD: 16.37 K/UL — HIGH (ref 3.8–10.5)
WBC # FLD AUTO: 16.37 K/UL — HIGH (ref 3.8–10.5)
WBC # FLD AUTO: 16.37 K/UL — HIGH (ref 3.8–10.5)
WBC UR QL: < 5 /HPF — SIGNIFICANT CHANGE UP
WBC UR QL: < 5 /HPF — SIGNIFICANT CHANGE UP

## 2023-10-17 PROCEDURE — 99215 OFFICE O/P EST HI 40 MIN: CPT

## 2023-10-17 PROCEDURE — 99221 1ST HOSP IP/OBS SF/LOW 40: CPT

## 2023-10-17 PROCEDURE — 99285 EMERGENCY DEPT VISIT HI MDM: CPT

## 2023-10-17 PROCEDURE — 93010 ELECTROCARDIOGRAM REPORT: CPT

## 2023-10-17 PROCEDURE — 74177 CT ABD & PELVIS W/CONTRAST: CPT | Mod: 26,MA

## 2023-10-17 RX ORDER — ONDANSETRON 8 MG/1
4 TABLET, FILM COATED ORAL EVERY 6 HOURS
Refills: 0 | Status: DISCONTINUED | OUTPATIENT
Start: 2023-10-17 | End: 2023-10-19

## 2023-10-17 RX ORDER — METRONIDAZOLE 500 MG
500 TABLET ORAL EVERY 8 HOURS
Refills: 0 | Status: DISCONTINUED | OUTPATIENT
Start: 2023-10-18 | End: 2023-10-19

## 2023-10-17 RX ORDER — SODIUM CHLORIDE 9 MG/ML
1000 INJECTION INTRAMUSCULAR; INTRAVENOUS; SUBCUTANEOUS ONCE
Refills: 0 | Status: COMPLETED | OUTPATIENT
Start: 2023-10-17 | End: 2023-10-17

## 2023-10-17 RX ORDER — ACETAMINOPHEN 500 MG
1000 TABLET ORAL ONCE
Refills: 0 | Status: COMPLETED | OUTPATIENT
Start: 2023-10-17 | End: 2023-10-17

## 2023-10-17 RX ORDER — CEFTRIAXONE 500 MG/1
1000 INJECTION, POWDER, FOR SOLUTION INTRAMUSCULAR; INTRAVENOUS EVERY 24 HOURS
Refills: 0 | Status: DISCONTINUED | OUTPATIENT
Start: 2023-10-18 | End: 2023-10-19

## 2023-10-17 RX ORDER — IOHEXOL 300 MG/ML
30 INJECTION, SOLUTION INTRAVENOUS ONCE
Refills: 0 | Status: COMPLETED | OUTPATIENT
Start: 2023-10-17 | End: 2023-10-17

## 2023-10-17 RX ORDER — METOPROLOL TARTRATE 50 MG
25 TABLET ORAL DAILY
Refills: 0 | Status: DISCONTINUED | OUTPATIENT
Start: 2023-10-18 | End: 2023-10-19

## 2023-10-17 RX ORDER — HEPARIN SODIUM 5000 [USP'U]/ML
6500 INJECTION INTRAVENOUS; SUBCUTANEOUS EVERY 6 HOURS
Refills: 0 | Status: DISCONTINUED | OUTPATIENT
Start: 2023-10-17 | End: 2023-10-17

## 2023-10-17 RX ORDER — HEPARIN SODIUM 5000 [USP'U]/ML
5000 INJECTION INTRAVENOUS; SUBCUTANEOUS EVERY 8 HOURS
Refills: 0 | Status: DISCONTINUED | OUTPATIENT
Start: 2023-10-17 | End: 2023-10-17

## 2023-10-17 RX ORDER — METRONIDAZOLE 500 MG
500 TABLET ORAL ONCE
Refills: 0 | Status: COMPLETED | OUTPATIENT
Start: 2023-10-17 | End: 2023-10-17

## 2023-10-17 RX ORDER — HEPARIN SODIUM 5000 [USP'U]/ML
1400 INJECTION INTRAVENOUS; SUBCUTANEOUS
Qty: 25000 | Refills: 0 | Status: DISCONTINUED | OUTPATIENT
Start: 2023-10-17 | End: 2023-10-18

## 2023-10-17 RX ORDER — HEPARIN SODIUM 5000 [USP'U]/ML
INJECTION INTRAVENOUS; SUBCUTANEOUS
Qty: 25000 | Refills: 0 | Status: DISCONTINUED | OUTPATIENT
Start: 2023-10-17 | End: 2023-10-17

## 2023-10-17 RX ORDER — CEFTRIAXONE 500 MG/1
1000 INJECTION, POWDER, FOR SOLUTION INTRAMUSCULAR; INTRAVENOUS ONCE
Refills: 0 | Status: COMPLETED | OUTPATIENT
Start: 2023-10-17 | End: 2023-10-17

## 2023-10-17 RX ORDER — HEPARIN SODIUM 5000 [USP'U]/ML
6500 INJECTION INTRAVENOUS; SUBCUTANEOUS ONCE
Refills: 0 | Status: COMPLETED | OUTPATIENT
Start: 2023-10-17 | End: 2023-10-17

## 2023-10-17 RX ORDER — ATORVASTATIN CALCIUM 80 MG/1
80 TABLET, FILM COATED ORAL AT BEDTIME
Refills: 0 | Status: DISCONTINUED | OUTPATIENT
Start: 2023-10-17 | End: 2023-10-19

## 2023-10-17 RX ORDER — SODIUM CHLORIDE 9 MG/ML
1000 INJECTION, SOLUTION INTRAVENOUS
Refills: 0 | Status: DISCONTINUED | OUTPATIENT
Start: 2023-10-17 | End: 2023-10-19

## 2023-10-17 RX ORDER — HEPARIN SODIUM 5000 [USP'U]/ML
3000 INJECTION INTRAVENOUS; SUBCUTANEOUS EVERY 6 HOURS
Refills: 0 | Status: DISCONTINUED | OUTPATIENT
Start: 2023-10-17 | End: 2023-10-17

## 2023-10-17 RX ORDER — HEPARIN SODIUM 5000 [USP'U]/ML
6500 INJECTION INTRAVENOUS; SUBCUTANEOUS ONCE
Refills: 0 | Status: DISCONTINUED | OUTPATIENT
Start: 2023-10-17 | End: 2023-10-17

## 2023-10-17 RX ADMIN — HEPARIN SODIUM 1400 UNIT(S)/HR: 5000 INJECTION INTRAVENOUS; SUBCUTANEOUS at 23:44

## 2023-10-17 RX ADMIN — HEPARIN SODIUM 5000 UNIT(S): 5000 INJECTION INTRAVENOUS; SUBCUTANEOUS at 22:30

## 2023-10-17 RX ADMIN — Medication 400 MILLIGRAM(S): at 16:03

## 2023-10-17 RX ADMIN — IOHEXOL 30 MILLILITER(S): 300 INJECTION, SOLUTION INTRAVENOUS at 16:02

## 2023-10-17 RX ADMIN — HEPARIN SODIUM 14 UNIT(S)/HR: 5000 INJECTION INTRAVENOUS; SUBCUTANEOUS at 23:54

## 2023-10-17 RX ADMIN — CEFTRIAXONE 100 MILLIGRAM(S): 500 INJECTION, POWDER, FOR SOLUTION INTRAMUSCULAR; INTRAVENOUS at 19:05

## 2023-10-17 RX ADMIN — HEPARIN SODIUM 6500 UNIT(S): 5000 INJECTION INTRAVENOUS; SUBCUTANEOUS at 23:42

## 2023-10-17 RX ADMIN — Medication 1000 MILLIGRAM(S): at 16:43

## 2023-10-17 RX ADMIN — SODIUM CHLORIDE 120 MILLILITER(S): 9 INJECTION, SOLUTION INTRAVENOUS at 22:31

## 2023-10-17 RX ADMIN — Medication 500 MILLIGRAM(S): at 21:12

## 2023-10-17 RX ADMIN — Medication 100 MILLIGRAM(S): at 19:36

## 2023-10-17 RX ADMIN — CEFTRIAXONE 1000 MILLIGRAM(S): 500 INJECTION, POWDER, FOR SOLUTION INTRAMUSCULAR; INTRAVENOUS at 19:40

## 2023-10-17 RX ADMIN — SODIUM CHLORIDE 1000 MILLILITER(S): 9 INJECTION INTRAMUSCULAR; INTRAVENOUS; SUBCUTANEOUS at 16:03

## 2023-10-17 RX ADMIN — Medication 400 MILLIGRAM(S): at 22:56

## 2023-10-17 NOTE — ED ADULT TRIAGE NOTE - CHIEF COMPLAINT QUOTE
Presents for c/o general abd pain x 3 days, loose stools, able to pass gas. Denies n/v/weakness/dizziness.

## 2023-10-17 NOTE — H&P ADULT - NSHPLABSRESULTS_GEN_ALL_CORE
10-17    141  |  101  |  12  ----------------------------<  101<H>  4.0   |  28  |  0.72    Ca    9.0      17 Oct 2023 16:04    TPro  7.3  /  Alb  3.7  /  TBili  0.7  /  DBili  x   /  AST  16  /  ALT  21  /  AlkPhos  95  10-17                             14.3   16.37 )-----------( 255      ( 17 Oct 2023 16:04 )             43.7         CT ABDOMEN-PELVIS W/ PO & IV CONTRAST     BLADDER: Within normal limits.  REPRODUCTIVE ORGANS: Prostate is enlarged.    BOWEL: Severe wall thickening and pericolonic fat stranding involving the sigmoid colon, consistent with acute diverticulitis. There is prominent adjacent phlegmon without organized fluid collection. There is reactive inflammation of adjacent ileum. No fistula. No bowel obstruction. Normal appendix.  PERITONEUM: No ascites.  VESSELS: Chronic postthrombotic changes in the left portal vein branches. Main portal vein is patent. The SMV is patent. Normal caliber abdominal aorta.  RETROPERITONEUM/LYMPH NODES: No lymphadenopathy.  ABDOMINAL WALL: Within normal limits.  BONES: Within normal limits.    IMPRESSION:  Acute sigmoid colon diverticulitis. Prominent adjacent phlegmon without organized fluid collection. No perforation. Reactive inflammation of the adjacent ileum.    Chronic post thrombotic changes in the left portal vein branches with left hepatic lobe atrophy

## 2023-10-17 NOTE — H&P ADULT - NSHPADDITIONALINFOADULT_GEN_ALL_CORE
****Senior Surgical  Resident Addendum****    Agree with A/P. Briefly, 59yo M, CAD x1 stent (5yrs ago, no DAPT), known L portal V thrombus, on eliquis, insig PSx, who presented to ER w 72hrs abd pain and diarrhea. ROS otherwise negative. VSS. Abd soft, ND, LLQ TTP w/o R/G, WBC 16.37 w corresponding PMN left shift. CT A/P consistent with acute sigmoid diverticulitis with adjacent phlegmon, and reactive ileitis. Will plan to admit to telemetry for Hinchey 1 complicated diverticulitis. Will cont resuscitation with crystalloid IVF, cont IVF Abx, NPO w bowel rest, STEFFANY, will hold eliquis and in interim with start hep gtt. Discussed w attending surgeon on call. Of note last cscope 2020, several polyps removed, benign pathology.

## 2023-10-17 NOTE — ED PROVIDER NOTE - OBJECTIVE STATEMENT
58 M co abd pain- BL crampy lower abd pain x 3 days ho pv thrombosis on Eliquis also ho cad w stents  no n/v sarath po today- small watery bm today- had cscope x 2 ho polyps  mod severity  worse w movement/walking  feels dehydrated  no sig allev factors

## 2023-10-17 NOTE — ED ADULT NURSE NOTE - NS ED NURSE REPORT GIVEN DT
17-Oct-2023 21:48 Cheiloplasty (Less Than 50%) Text: A decision was made to reconstruct the defect with a  cheiloplasty.  The defect was undermined extensively.  Additional orbicularis oris muscle was excised with a 15 blade scalpel.  The defect was converted into a full thickness wedge, of less than 50% of the vertical height of the lip, to facilite a better cosmetic result.  Small vessels were then tied off with 5-0 monocyrl. The orbicularis oris, superficial fascia, adipose and dermis were then reapproximated.  After the deeper layers were approximated the epidermis was reapproximated with particular care given to realign the vermilion border.

## 2023-10-17 NOTE — ED ADULT NURSE NOTE - NS ED PATIENT SAFETY CONCERN
No Puncture Wound in the Foot    WHAT YOU NEED TO KNOW:    A puncture wound is a hole in the skin of your foot made by a sharp, pointed object. The area may be bruised or swollen. You may have bleeding, pain, or trouble moving the affected area.  Puncture Wound         DISCHARGE INSTRUCTIONS:    Seek care immediately if:   •You have severe pain.      •You have numbness or tingling in the area of your wound.      •Your wound starts bleeding and does not stop, even after you apply pressure.      Call your doctor if:   •You have new drainage or a bad odor coming from the wound.      •You have a fever or chills.      •You have increased swelling, redness, or pain.      •You have red streaks on your skin coming from your wound.      •You have questions or concerns about your condition or care.      Medicines: You may need any of the following:  •NSAIDs, such as ibuprofen, help decrease swelling, pain, and fever. This medicine is available with or without a doctor's order. NSAIDs can cause stomach bleeding or kidney problems in certain people. If you take blood thinner medicine, always ask your healthcare provider if NSAIDs are safe for you. Always read the medicine label and follow directions.      •Antibiotics help prevent a bacterial infection.      •Take your medicine as directed. Contact your healthcare provider if you think your medicine is not helping or if you have side effects. Tell your provider if you are allergic to any medicine. Keep a list of the medicines, vitamins, and herbs you take. Include the amounts, and when and why you take them. Bring the list or the pill bottles to follow-up visits. Carry your medicine list with you in case of an emergency.      Care for your wound as directed: Keep your wound clean and dry. When you are allowed to bathe, carefully wash the wound with soap and water. Dry the area and put on new, clean bandages as directed. Change your bandages when they get wet or dirty.    Rest and elevate your foot above the level of your heart as often as you can. This will help decrease swelling and pain. Prop your foot on pillows or blankets to keep it elevated comfortably.  Elevate Leg         Follow up with your doctor in 2 to 3 days: Write down your questions so you remember to ask them during your visits.       © Copyright [employer] 2022

## 2023-10-17 NOTE — ED PROVIDER NOTE - CLINICAL SUMMARY MEDICAL DECISION MAKING FREE TEXT BOX
58 M w abd pain BL lower abd  check labs, CT a/p, serial abd exams 58 M w abd pain BL lower abd  check labs, CT a/p, serial abd exams  acute diverticulitis with phlegmon/early abscess- admit for IV abx to gen surg

## 2023-10-17 NOTE — ED PROVIDER NOTE - NEUROLOGICAL, MLM
Letitia hanley, alternative discussed. Scripts given. Alert and oriented, no focal deficits, no motor or sensory deficits.

## 2023-10-17 NOTE — ED ADULT NURSE NOTE - OBJECTIVE STATEMENT
Pt A&Ox4 presents to ED with abdominal pain x 3 days. Pt reports he has had diarrhea, but the amount is small. Reports mild relief with ibuprofen and tylenol. Denies chest pain, shortness of breath, nausea/vomiting, fevers/chills, urinary symptoms. Ambulates with steady gait. Hx stent in heart, portal vein thrombosis (on eliquis).

## 2023-10-17 NOTE — H&P ADULT - NSHPPHYSICALEXAM_GEN_ALL_CORE
General: NAD, resting comfortably  HEENT: NC/AT, EOMI, normal hearing, no oral lesions, no LAD, neck supple  Pulmonary: Normal resp effort, CTA-B, right blue cath in place  Cardiovascular: NSR, no murmurs  Abdominal: Soft, timpanist slightly tender in LLQ, no rebound no guarding.   Extremities: WWP, normal strength, no clubbing/cyanosis/edema  Neuro: A/O x 3, CNs II-XII grossly intact, normal sensation, no focal deficits  Pulses: Palpable distal pulses   Psych: Normal mood and affect

## 2023-10-17 NOTE — H&P ADULT - HISTORY OF PRESENT ILLNESS
59 yo M w/ PMHx of HLD, CAD s/p 1 stent, portal vein thrombosis on Eliquis, no PSHx presenting for 3 days of worsening lower abdominal pain without associated constitutional sxs. Pain is dull and baseline but intermittently crampy in LLQ and worse with positioning. He denied nasuea, emesis, chills or sob. He has been having low volume loose stools, most recently earlier today     Last colonoscopy was about 3 years ago and a few polyps were removed, with a 5 year follow up scheduled. He has never had an EGD.   57 yo M w/ PMHx of CAD s/p 1 stent (2018), portal vein thrombosis on Eliquis, no PSHx presenting for 3 days of worsening lower abdominal pain without associated constitutional sxs. Pain is dull and baseline but intermittently crampy in LLQ and worse with positioning. He denied nasuea, emesis, chills or sob. He has been having low volume loose stools, most recently earlier today     Last colonoscopy was about 3 years ago and a few polyps were removed, with a 5 year follow up scheduled. He has never had an EGD. medications include Atorvastatin, Eliquis and Metoprolol

## 2023-10-17 NOTE — H&P ADULT - ASSESSMENT
59 yo M w/ PMHx of CAD s/p 1 stent, portal vein thrombosis on Eliquis, no PSHx presenting for 3 days of worsening lower abdominal pain without associated constitutional sxs. Abdomen soft, non-distended and mildly tender without guarding or rebound in LLQ. Patient hemodynamically normal and afebrile. Leukocytosis (16) with left shift. CT A/P notable for perisigmoid fat stranding and adjacent phlegmon. Clinical picture in keeping with acute sigmoid diverticulitis Hinchey 1a. Will admit for further management     Plan:   - NPO/IVF   - IV Abx (Cef, Flagyl)   - Pain control   - Encourage OOB/ambulation   - Transition Eliquis to Hep gtt    - Continue Metoprolol & Atorvastatin   - Serial abdominal exams     Seen and d/w chief resident, attending aware and agrees with plan

## 2023-10-18 PROBLEM — R10.9 ABDOMINAL DISCOMFORT: Status: ACTIVE | Noted: 2019-11-11

## 2023-10-18 LAB
ANION GAP SERPL CALC-SCNC: 9 MMOL/L — SIGNIFICANT CHANGE UP (ref 5–17)
ANION GAP SERPL CALC-SCNC: 9 MMOL/L — SIGNIFICANT CHANGE UP (ref 5–17)
APTT BLD: 106.8 SEC — HIGH (ref 24.5–35.6)
APTT BLD: 106.8 SEC — HIGH (ref 24.5–35.6)
APTT BLD: 82.3 SEC — HIGH (ref 24.5–35.6)
APTT BLD: 82.3 SEC — HIGH (ref 24.5–35.6)
APTT BLD: 86.3 SEC — HIGH (ref 24.5–35.6)
APTT BLD: 86.3 SEC — HIGH (ref 24.5–35.6)
BLD GP AB SCN SERPL QL: NEGATIVE — SIGNIFICANT CHANGE UP
BLD GP AB SCN SERPL QL: NEGATIVE — SIGNIFICANT CHANGE UP
BUN SERPL-MCNC: 9 MG/DL — SIGNIFICANT CHANGE UP (ref 7–23)
BUN SERPL-MCNC: 9 MG/DL — SIGNIFICANT CHANGE UP (ref 7–23)
CALCIUM SERPL-MCNC: 8.1 MG/DL — LOW (ref 8.4–10.5)
CALCIUM SERPL-MCNC: 8.1 MG/DL — LOW (ref 8.4–10.5)
CHLORIDE SERPL-SCNC: 105 MMOL/L — SIGNIFICANT CHANGE UP (ref 96–108)
CHLORIDE SERPL-SCNC: 105 MMOL/L — SIGNIFICANT CHANGE UP (ref 96–108)
CO2 SERPL-SCNC: 24 MMOL/L — SIGNIFICANT CHANGE UP (ref 22–31)
CO2 SERPL-SCNC: 24 MMOL/L — SIGNIFICANT CHANGE UP (ref 22–31)
CREAT SERPL-MCNC: 0.62 MG/DL — SIGNIFICANT CHANGE UP (ref 0.5–1.3)
CREAT SERPL-MCNC: 0.62 MG/DL — SIGNIFICANT CHANGE UP (ref 0.5–1.3)
EGFR: 110 ML/MIN/1.73M2 — SIGNIFICANT CHANGE UP
EGFR: 110 ML/MIN/1.73M2 — SIGNIFICANT CHANGE UP
GLUCOSE SERPL-MCNC: 89 MG/DL — SIGNIFICANT CHANGE UP (ref 70–99)
GLUCOSE SERPL-MCNC: 89 MG/DL — SIGNIFICANT CHANGE UP (ref 70–99)
HCT VFR BLD CALC: 39.1 % — SIGNIFICANT CHANGE UP (ref 39–50)
HCT VFR BLD CALC: 39.1 % — SIGNIFICANT CHANGE UP (ref 39–50)
HGB BLD-MCNC: 13 G/DL — SIGNIFICANT CHANGE UP (ref 13–17)
HGB BLD-MCNC: 13 G/DL — SIGNIFICANT CHANGE UP (ref 13–17)
INR BLD: 1.28 — HIGH (ref 0.85–1.18)
INR BLD: 1.28 — HIGH (ref 0.85–1.18)
MAGNESIUM SERPL-MCNC: 1.7 MG/DL — SIGNIFICANT CHANGE UP (ref 1.6–2.6)
MAGNESIUM SERPL-MCNC: 1.7 MG/DL — SIGNIFICANT CHANGE UP (ref 1.6–2.6)
MCHC RBC-ENTMCNC: 28.7 PG — SIGNIFICANT CHANGE UP (ref 27–34)
MCHC RBC-ENTMCNC: 28.7 PG — SIGNIFICANT CHANGE UP (ref 27–34)
MCHC RBC-ENTMCNC: 33.2 GM/DL — SIGNIFICANT CHANGE UP (ref 32–36)
MCHC RBC-ENTMCNC: 33.2 GM/DL — SIGNIFICANT CHANGE UP (ref 32–36)
MCV RBC AUTO: 86.3 FL — SIGNIFICANT CHANGE UP (ref 80–100)
MCV RBC AUTO: 86.3 FL — SIGNIFICANT CHANGE UP (ref 80–100)
NRBC # BLD: 0 /100 WBCS — SIGNIFICANT CHANGE UP (ref 0–0)
NRBC # BLD: 0 /100 WBCS — SIGNIFICANT CHANGE UP (ref 0–0)
PHOSPHATE SERPL-MCNC: 1.9 MG/DL — LOW (ref 2.5–4.5)
PHOSPHATE SERPL-MCNC: 1.9 MG/DL — LOW (ref 2.5–4.5)
PLATELET # BLD AUTO: 227 K/UL — SIGNIFICANT CHANGE UP (ref 150–400)
PLATELET # BLD AUTO: 227 K/UL — SIGNIFICANT CHANGE UP (ref 150–400)
POTASSIUM SERPL-MCNC: 3.4 MMOL/L — LOW (ref 3.5–5.3)
POTASSIUM SERPL-MCNC: 3.4 MMOL/L — LOW (ref 3.5–5.3)
POTASSIUM SERPL-SCNC: 3.4 MMOL/L — LOW (ref 3.5–5.3)
POTASSIUM SERPL-SCNC: 3.4 MMOL/L — LOW (ref 3.5–5.3)
PROTHROM AB SERPL-ACNC: 14.5 SEC — HIGH (ref 9.5–13)
PROTHROM AB SERPL-ACNC: 14.5 SEC — HIGH (ref 9.5–13)
RBC # BLD: 4.53 M/UL — SIGNIFICANT CHANGE UP (ref 4.2–5.8)
RBC # BLD: 4.53 M/UL — SIGNIFICANT CHANGE UP (ref 4.2–5.8)
RBC # FLD: 13.2 % — SIGNIFICANT CHANGE UP (ref 10.3–14.5)
RBC # FLD: 13.2 % — SIGNIFICANT CHANGE UP (ref 10.3–14.5)
RH IG SCN BLD-IMP: POSITIVE — SIGNIFICANT CHANGE UP
RH IG SCN BLD-IMP: POSITIVE — SIGNIFICANT CHANGE UP
SODIUM SERPL-SCNC: 138 MMOL/L — SIGNIFICANT CHANGE UP (ref 135–145)
SODIUM SERPL-SCNC: 138 MMOL/L — SIGNIFICANT CHANGE UP (ref 135–145)
UFH PPP CHRO-ACNC: 0.39 IU/ML — SIGNIFICANT CHANGE UP (ref 0.3–0.7)
UFH PPP CHRO-ACNC: 0.39 IU/ML — SIGNIFICANT CHANGE UP (ref 0.3–0.7)
WBC # BLD: 10.12 K/UL — SIGNIFICANT CHANGE UP (ref 3.8–10.5)
WBC # BLD: 10.12 K/UL — SIGNIFICANT CHANGE UP (ref 3.8–10.5)
WBC # FLD AUTO: 10.12 K/UL — SIGNIFICANT CHANGE UP (ref 3.8–10.5)
WBC # FLD AUTO: 10.12 K/UL — SIGNIFICANT CHANGE UP (ref 3.8–10.5)

## 2023-10-18 PROCEDURE — 99223 1ST HOSP IP/OBS HIGH 75: CPT

## 2023-10-18 PROCEDURE — 99231 SBSQ HOSP IP/OBS SF/LOW 25: CPT

## 2023-10-18 RX ORDER — LANOLIN ALCOHOL/MO/W.PET/CERES
5 CREAM (GRAM) TOPICAL AT BEDTIME
Refills: 0 | Status: DISCONTINUED | OUTPATIENT
Start: 2023-10-18 | End: 2023-10-19

## 2023-10-18 RX ORDER — POTASSIUM PHOSPHATE, MONOBASIC POTASSIUM PHOSPHATE, DIBASIC 236; 224 MG/ML; MG/ML
30 INJECTION, SOLUTION INTRAVENOUS ONCE
Refills: 0 | Status: COMPLETED | OUTPATIENT
Start: 2023-10-18 | End: 2023-10-18

## 2023-10-18 RX ORDER — HEPARIN SODIUM 5000 [USP'U]/ML
13 INJECTION INTRAVENOUS; SUBCUTANEOUS
Qty: 25000 | Refills: 0 | Status: DISCONTINUED | OUTPATIENT
Start: 2023-10-18 | End: 2023-10-18

## 2023-10-18 RX ORDER — MAGNESIUM SULFATE 500 MG/ML
1 VIAL (ML) INJECTION ONCE
Refills: 0 | Status: COMPLETED | OUTPATIENT
Start: 2023-10-18 | End: 2023-10-18

## 2023-10-18 RX ORDER — INFLUENZA VIRUS VACCINE 15; 15; 15; 15 UG/.5ML; UG/.5ML; UG/.5ML; UG/.5ML
0.5 SUSPENSION INTRAMUSCULAR ONCE
Refills: 0 | Status: DISCONTINUED | OUTPATIENT
Start: 2023-10-18 | End: 2023-10-19

## 2023-10-18 RX ORDER — HEPARIN SODIUM 5000 [USP'U]/ML
1300 INJECTION INTRAVENOUS; SUBCUTANEOUS
Qty: 25000 | Refills: 0 | Status: DISCONTINUED | OUTPATIENT
Start: 2023-10-18 | End: 2023-10-19

## 2023-10-18 RX ADMIN — Medication 25 MILLIGRAM(S): at 05:45

## 2023-10-18 RX ADMIN — Medication 100 MILLIGRAM(S): at 12:06

## 2023-10-18 RX ADMIN — Medication 100 GRAM(S): at 07:16

## 2023-10-18 RX ADMIN — SODIUM CHLORIDE 120 MILLILITER(S): 9 INJECTION, SOLUTION INTRAVENOUS at 12:11

## 2023-10-18 RX ADMIN — ATORVASTATIN CALCIUM 80 MILLIGRAM(S): 80 TABLET, FILM COATED ORAL at 22:10

## 2023-10-18 RX ADMIN — POTASSIUM PHOSPHATE, MONOBASIC POTASSIUM PHOSPHATE, DIBASIC 83.33 MILLIMOLE(S): 236; 224 INJECTION, SOLUTION INTRAVENOUS at 09:53

## 2023-10-18 RX ADMIN — HEPARIN SODIUM 13 UNIT(S)/HR: 5000 INJECTION INTRAVENOUS; SUBCUTANEOUS at 08:50

## 2023-10-18 RX ADMIN — Medication 5 MILLIGRAM(S): at 22:10

## 2023-10-18 RX ADMIN — Medication 100 MILLIGRAM(S): at 19:00

## 2023-10-18 RX ADMIN — Medication 100 MILLIGRAM(S): at 02:57

## 2023-10-18 RX ADMIN — CEFTRIAXONE 100 MILLIGRAM(S): 500 INJECTION, POWDER, FOR SOLUTION INTRAMUSCULAR; INTRAVENOUS at 18:08

## 2023-10-18 RX ADMIN — HEPARIN SODIUM 13 UNIT(S)/HR: 5000 INJECTION INTRAVENOUS; SUBCUTANEOUS at 18:23

## 2023-10-18 NOTE — CHART NOTE - NSCHARTNOTEFT_GEN_A_CORE
Pt seen in bed on 8Lach. Pt denies nausea/vomiting. +f/-bm. On exam, abdomen is soft, ttp LLQ, mildly distended, nonperitonitic.

## 2023-10-18 NOTE — CONSULT NOTE ADULT - ASSESSMENT
59 yo M w/ PMHx of CAD s/p 1 stent, portal vein thrombosis on Eliquis, no PSHx presenting for 3 days of worsening lower abdominal pain. Pt admitted for acute sigmoid diverticulitis Hinchey 1a     acute sigmoid diverticulitis   Leukocytosis 16 k on admission -> DT 10 k   CT A/P notable for perisigmoid fat stranding and adjacent phlegmon  on CTX/flagyl   diet and rest of the care per primary team     CAD   Chronic PVT   cw statin   pt on eliquis - transition to heparin drip inpt       Hypokalemia and hypophosphatemia   k 3.4   phos 1.9  agree w replacement   fu am labs     HTN   cw toprol     dvt ppx   heparin drip

## 2023-10-18 NOTE — CHART NOTE - NSCHARTNOTEFT_GEN_A_CORE
Patient seen OOB sitting in chair in 8LA room. Admits crampy abdominal "gas pain" but states his pain has improved since he first arrived. No nausea/vomiting. +Flatus, +BM. On exam abdomen is +TTP periumbilical area and LLQ; soft. Patient seen OOB sitting in chair in 8LA room. Admits crampy abdominal "gas pain" but states his pain has improved since he first arrived. No nausea/vomiting. +Flatus, +BM. On exam abdomen is +TTP periumbilical area and LLQ; soft; nondistended.

## 2023-10-18 NOTE — CONSULT NOTE ADULT - SUBJECTIVE AND OBJECTIVE BOX
Patient is a 58y old  Male who presents with a chief complaint of Acute sigmoid diverticulitis (18 Oct 2023 06:54)      HPI:  59 yo M w/ PMHx of CAD s/p 1 stent (2018), portal vein thrombosis on Eliquis, no PSHx presenting for 3 days of worsening lower abdominal pain without associated constitutional sxs. Pain is dull and baseline but intermittently crampy in LLQ and worse with positioning. He denied nasuea, emesis, chills or sob. He has been having low volume loose stools, most recently earlier today     Last colonoscopy was about 3 years ago and a few polyps were removed, with a 5 year follow up scheduled. He has never had an EGD. medications include Atorvastatin, Eliquis and Metoprolol    (17 Oct 2023 23:50)      Allergies    No Known Drug Allergies  artichokes (Hives)    Intolerances        MEDICATIONS  (STANDING):  atorvastatin 80 milliGRAM(s) Oral at bedtime  cefTRIAXone   IVPB 1000 milliGRAM(s) IV Intermittent every 24 hours  heparin  Infusion 1300 Unit(s)/Hr (13 mL/Hr) IV Continuous <Continuous>  lactated ringers. 1000 milliLiter(s) (120 mL/Hr) IV Continuous <Continuous>  metoprolol succinate ER 25 milliGRAM(s) Oral daily  metroNIDAZOLE  IVPB 500 milliGRAM(s) IV Intermittent every 8 hours  potassium phosphate IVPB 30 milliMole(s) IV Intermittent once    MEDICATIONS  (PRN):  melatonin 5 milliGRAM(s) Oral at bedtime PRN Insomnia  ondansetron Injectable 4 milliGRAM(s) IV Push every 6 hours PRN Nausea and/or Vomiting      Daily     Daily     Drug Dosing Weight  Height (cm): 177.8 (24 Aug 2022 01:37)  Weight (kg): 78 (17 Oct 2023 15:07)  BMI (kg/m2): 24.7 (17 Oct 2023 15:07)  BSA (m2): 1.96 (17 Oct 2023 15:07)    PAST MEDICAL & SURGICAL HISTORY:  CAD (coronary artery disease)  (stent 2018)      Portal vein thrombosis          FAMILY HISTORY:        REVIEW OF SYSTEMS:    CONSTITUTIONAL: No fever, weight loss, or fatigue  EYES: No eye pain, visual disturbances, or discharge  ENMT:  No difficulty hearing, tinnitus, vertigo; No sinus or throat pain  NECK: No pain or stiffness  RESPIRATORY: No cough, wheezing, chills or hemoptysis; No shortness of breath  CARDIOVASCULAR: No chest pain, palpitations, dizziness, or leg swelling  GASTROINTESTINAL: improving abdominal  pain. No nausea, vomiting, or hematemesis; No diarrhea or constipation. No melena or hematochezia.  GENITOURINARY: No dysuria, frequency, hematuria, or incontinence  LYMPH NODES: No enlarged glands  ENDOCRINE: No heat or cold intolerance; No hair loss  MUSCULOSKELETAL: No joint pain or swelling; No muscle, back, or extremity pain  NEUROLOGICAL: No headaches, memory loss, loss of strength, numbness, or tremors  SKIN: No itching, burning, rashes, or lesion           Vital Signs Last 24 Hrs  T(C): 36.7 (18 Oct 2023 09:21), Max: 37 (18 Oct 2023 04:55)  T(F): 98 (18 Oct 2023 09:21), Max: 98.6 (18 Oct 2023 04:55)  HR: 78 (18 Oct 2023 08:37) (70 - 92)  BP: 111/77 (18 Oct 2023 08:37) (111/77 - 127/83)  BP(mean): 89 (18 Oct 2023 08:37) (89 - 95)  ABP: --  ABP(mean): --  RR: 18 (18 Oct 2023 08:37) (16 - 18)  SpO2: 95% (18 Oct 2023 08:37) (94% - 98%)    O2 Parameters below as of 18 Oct 2023 08:37  Patient On (Oxygen Delivery Method): room air                I&O's Detail    18 Oct 2023 07:01  -  18 Oct 2023 11:37  --------------------------------------------------------  IN:    Heparin: 39 mL    Lactated Ringers: 360 mL  Total IN: 399 mL    OUT:    Oral Fluid: 0 mL    Voided (mL): 0 mL  Total OUT: 0 mL    Total NET: 399 mL          PHYSICAL EXAM:    GENERAL: NAD, well-groomed, well-developed  HEAD:  Atraumatic, Normocephalic  EYES: EOMI, PERRLA, conjunctiva and sclera clear  ENMT: No tonsillar erythema, exudates, or enlargement; Moist mucous membranes, Good dentition, No lesions  NECK: Supple, No JVD, Normal thyroid  NERVOUS SYSTEM:  Alert & Oriented X3, Good concentration; Motor Strength 5/5 B/L upper and lower extremities   CHEST/LUNG: Clear to percussion bilaterally; No rales, rhonchi, wheezing, or rubs  HEART: Regular rate and rhythm; No murmurs, rubs, or gallops  ABDOMEN: Soft, TTP LLQ- , Nondistended; Bowel sounds present  EXTREMITIES:  2+ Peripheral Pulses, No clubbing, cyanosis, or edema  LYMPH: No lymphadenopathy noted  SKIN: No rashes or lesions    LABS:  CBC Full  -  ( 18 Oct 2023 05:30 )  WBC Count : 10.12 K/uL  RBC Count : 4.53 M/uL  Hemoglobin : 13.0 g/dL  Hematocrit : 39.1 %  Platelet Count - Automated : 227 K/uL  Mean Cell Volume : 86.3 fl  Mean Cell Hemoglobin : 28.7 pg  Mean Cell Hemoglobin Concentration : 33.2 gm/dL  Auto Neutrophil # : x  Auto Lymphocyte # : x  Auto Monocyte # : x  Auto Eosinophil # : x  Auto Basophil # : x  Auto Neutrophil % : x  Auto Lymphocyte % : x  Auto Monocyte % : x  Auto Eosinophil % : x  Auto Basophil % : x    10-18    138  |  105  |  9   ----------------------------<  89  3.4<L>   |  24  |  0.62    Ca    8.1<L>      18 Oct 2023 05:30  Phos  1.9     10-18  Mg     1.7     10-18    TPro  7.3  /  Alb  3.7  /  TBili  0.7  /  DBili  x   /  AST  16  /  ALT  21  /  AlkPhos  95  10-17    CAPILLARY BLOOD GLUCOSE        PT/INR - ( 18 Oct 2023 05:30 )   PT: 14.5 sec;   INR: 1.28          PTT - ( 18 Oct 2023 05:30 )  PTT:106.8 sec  Urinalysis Basic - ( 18 Oct 2023 05:30 )    Color: x / Appearance: x / SG: x / pH: x  Gluc: 89 mg/dL / Ketone: x  / Bili: x / Urobili: x   Blood: x / Protein: x / Nitrite: x   Leuk Esterase: x / RBC: x / WBC x   Sq Epi: x / Non Sq Epi: x / Bacteria: x              EKG:    ECHO, US:    RADIOLOGY:

## 2023-10-18 NOTE — PATIENT PROFILE ADULT - CONTRAINDICATIONS & PRECAUTIONS (SELECT ALL THAT APPLY)
"[facility]   Nursery  Neonatology  Progress Note    Patient Name: Maris Iyer  MRN: [id number]  Admission Date: 2023  Hospital Length of Stay: 2 days  Attending Physician: Teofilo Garsia DO    At Birth Gestational Age: 36w2d  Day of Life: 2 days  Corrected Gestational Age 36w 4d  Chronological Age: 2 days    Subjective:     Interval History:     Scheduled Meds:  Continuous Infusions:  PRN Meds:    Nutritional Support:     Objective:     Vital Signs (Most Recent):  Temp: 97.6 °F (36.4 °C) (23 0715)  Pulse: 150 (23 0715)  Resp: 64 (23 0715)  BP: (!) 81/41 (23 1620)  SpO2: (!) 98 % (23 1720) Vital Signs (24h Range):  Temp:  [97.6 °F (36.4 °C)-98.9 °F (37.2 °C)] 97.6 °F (36.4 °C)  Pulse:  [139-150] 150  Resp:  [40-64] 64     Anthropometrics:  Head Circumference: 34 cm  Weight: 2866 g (6 lb 5.1 oz) 64 %ile (Z= 0.36) based on [place] (Girls, 22-50 Weeks) weight-for-age data using vitals from 2023.  Weight change: -43 g (-1.5 oz)  Height: 48.3 cm (19") 72 %ile (Z= 0.57) based on [place] (Girls, 22-50 Weeks) Length-for-age data based on Length recorded on 2023.    Intake/Output - Last 3 Shifts          0700   0659  0700   0659  0700   0659    P.O. 100 110     Total Intake(mL/kg) 100 (34.31) 110 (38.38)     Net +100 +110            Urine Occurrence 4 x 6 x     Stool Occurrence 2 x 4 x 1 x             Physical Exam  Constitutional:       General: She is active.      Appearance: Normal appearance. She is well-developed.   HENT:      Head: Normocephalic and atraumatic. Anterior fontanelle is flat.      Right Ear: External ear normal.      Left Ear: External ear normal.      Nose: Nose normal.      Mouth/Throat:      Mouth: Mucous membranes are moist.      Pharynx: Oropharynx is clear.   Eyes:      General: Red reflex is present bilaterally.      Pupils: Pupils are equal, round, and reactive to light.   Cardiovascular:      Rate " none... and Rhythm: Normal rate and regular rhythm.      Pulses: Normal pulses.      Heart sounds: Normal heart sounds.   Pulmonary:      Effort: Pulmonary effort is normal.      Breath sounds: Normal breath sounds.   Abdominal:      General: Bowel sounds are normal.      Palpations: Abdomen is soft.   Genitourinary:     General: Normal vulva.      Rectum: Normal.   Musculoskeletal:         General: Normal range of motion.      Cervical back: Normal range of motion.   Skin:     General: Skin is warm.      Capillary Refill: Capillary refill takes less than 2 seconds.   Neurological:      General: No focal deficit present.      Mental Status: She is alert.      Primitive Reflexes: Suck normal. Symmetric [place].            Ventilator Data (Last 24H):              Recent Labs     23  1520   PH 7.320   PCO2 40.8   PO2 77   HCO3 21.0   POCSATURATED 94        Lines/Drains:         Laboratory:      Diagnostic Results:      Assessment/Plan:     Obstetric  *   infant of 36 completed weeks of gestation  This is a 36-37 week female infant born vaginally. Mother had no prenatal care. Maternal labs negative; GBS unknown, treated. Mother came in to L&D in active labor, SROM at midnight. She has a history of drug use and does not have custody of her other children. Her UDS was positive for amphetamines on admission, infant UDS pending, SS consult ordered. Infant received routine care with 8/9 Apgars. To wellborn nursery with mild grunting and retractions, RA sats 96%. ABGs and labs drawn. Mild respiratory distress improved. Initial glucose stable. Mother plans to bottle feed. Will place on glucose protocol, feed 22 lydia, and follow closely in wellborn nursery.     : Stable in crib. PE as noted. No murmur, no jaundice. Bottle feeding, void and stool. Glucoses stable. Infant UDS + for amphetamines, no signs of withdrawal.  following.     - stable doing well, hopefully SS will take custody of infant  since mom does not have custody of her other children, and has drug history,           Teofilo Garsia, DO  Neonatology  [facility] -  Nurse

## 2023-10-18 NOTE — PROGRESS NOTE ADULT - ASSESSMENT
57 yo M w/ PMHx of CAD s/p 1 stent, portal vein thrombosis on Eliquis, no PSHx presenting for 3 days of worsening lower abdominal pain without associated constitutional sxs. Abdomen soft, non-distended and mildly tender without guarding or rebound in LLQ. Patient hemodynamically normal and afebrile. Leukocytosis (16) with left shift. CT A/P notable for perisigmoid fat stranding and adjacent phlegmon. Clinical picture in keeping with acute sigmoid diverticulitis Hinchey 1a. Will admit for further management     - NPO/IVF   - Cef/flagyl  - Heparin gtt  - STEFFANY  - Pain/nausea control   - OOBA/IS/SCDs  - Home meds as appropriate  - AM Labs

## 2023-10-19 ENCOUNTER — TRANSCRIPTION ENCOUNTER (OUTPATIENT)
Age: 58
End: 2023-10-19

## 2023-10-19 VITALS
RESPIRATION RATE: 18 BRPM | SYSTOLIC BLOOD PRESSURE: 130 MMHG | HEART RATE: 76 BPM | TEMPERATURE: 97 F | DIASTOLIC BLOOD PRESSURE: 94 MMHG | OXYGEN SATURATION: 95 %

## 2023-10-19 LAB
ANION GAP SERPL CALC-SCNC: 11 MMOL/L — SIGNIFICANT CHANGE UP (ref 5–17)
APTT BLD: 64.1 SEC — HIGH (ref 24.5–35.6)
APTT BLD: 64.1 SEC — HIGH (ref 24.5–35.6)
APTT BLD: 83 SEC — HIGH (ref 24.5–35.6)
APTT BLD: 83 SEC — HIGH (ref 24.5–35.6)
BUN SERPL-MCNC: 6 MG/DL — LOW (ref 7–23)
BUN SERPL-MCNC: 6 MG/DL — LOW (ref 7–23)
BUN SERPL-MCNC: 8 MG/DL — SIGNIFICANT CHANGE UP (ref 7–23)
BUN SERPL-MCNC: 8 MG/DL — SIGNIFICANT CHANGE UP (ref 7–23)
CALCIUM SERPL-MCNC: 8.6 MG/DL — SIGNIFICANT CHANGE UP (ref 8.4–10.5)
CHLORIDE SERPL-SCNC: 105 MMOL/L — SIGNIFICANT CHANGE UP (ref 96–108)
CHLORIDE SERPL-SCNC: 105 MMOL/L — SIGNIFICANT CHANGE UP (ref 96–108)
CHLORIDE SERPL-SCNC: 106 MMOL/L — SIGNIFICANT CHANGE UP (ref 96–108)
CHLORIDE SERPL-SCNC: 106 MMOL/L — SIGNIFICANT CHANGE UP (ref 96–108)
CO2 SERPL-SCNC: 23 MMOL/L — SIGNIFICANT CHANGE UP (ref 22–31)
CO2 SERPL-SCNC: 23 MMOL/L — SIGNIFICANT CHANGE UP (ref 22–31)
CO2 SERPL-SCNC: 24 MMOL/L — SIGNIFICANT CHANGE UP (ref 22–31)
CO2 SERPL-SCNC: 24 MMOL/L — SIGNIFICANT CHANGE UP (ref 22–31)
CREAT SERPL-MCNC: 0.62 MG/DL — SIGNIFICANT CHANGE UP (ref 0.5–1.3)
CREAT SERPL-MCNC: 0.62 MG/DL — SIGNIFICANT CHANGE UP (ref 0.5–1.3)
CREAT SERPL-MCNC: 0.63 MG/DL — SIGNIFICANT CHANGE UP (ref 0.5–1.3)
CREAT SERPL-MCNC: 0.63 MG/DL — SIGNIFICANT CHANGE UP (ref 0.5–1.3)
EGFR: 110 ML/MIN/1.73M2 — SIGNIFICANT CHANGE UP
GLUCOSE SERPL-MCNC: 109 MG/DL — HIGH (ref 70–99)
GLUCOSE SERPL-MCNC: 109 MG/DL — HIGH (ref 70–99)
GLUCOSE SERPL-MCNC: 117 MG/DL — HIGH (ref 70–99)
GLUCOSE SERPL-MCNC: 117 MG/DL — HIGH (ref 70–99)
HCT VFR BLD CALC: 39 % — SIGNIFICANT CHANGE UP (ref 39–50)
HCT VFR BLD CALC: 39 % — SIGNIFICANT CHANGE UP (ref 39–50)
HGB BLD-MCNC: 12.9 G/DL — LOW (ref 13–17)
HGB BLD-MCNC: 12.9 G/DL — LOW (ref 13–17)
MAGNESIUM SERPL-MCNC: 1.8 MG/DL — SIGNIFICANT CHANGE UP (ref 1.6–2.6)
MAGNESIUM SERPL-MCNC: 1.8 MG/DL — SIGNIFICANT CHANGE UP (ref 1.6–2.6)
MAGNESIUM SERPL-MCNC: 2.3 MG/DL — SIGNIFICANT CHANGE UP (ref 1.6–2.6)
MAGNESIUM SERPL-MCNC: 2.3 MG/DL — SIGNIFICANT CHANGE UP (ref 1.6–2.6)
MCHC RBC-ENTMCNC: 28.5 PG — SIGNIFICANT CHANGE UP (ref 27–34)
MCHC RBC-ENTMCNC: 28.5 PG — SIGNIFICANT CHANGE UP (ref 27–34)
MCHC RBC-ENTMCNC: 33.1 GM/DL — SIGNIFICANT CHANGE UP (ref 32–36)
MCHC RBC-ENTMCNC: 33.1 GM/DL — SIGNIFICANT CHANGE UP (ref 32–36)
MCV RBC AUTO: 86.1 FL — SIGNIFICANT CHANGE UP (ref 80–100)
MCV RBC AUTO: 86.1 FL — SIGNIFICANT CHANGE UP (ref 80–100)
NRBC # BLD: 0 /100 WBCS — SIGNIFICANT CHANGE UP (ref 0–0)
NRBC # BLD: 0 /100 WBCS — SIGNIFICANT CHANGE UP (ref 0–0)
PHOSPHATE SERPL-MCNC: 2.4 MG/DL — LOW (ref 2.5–4.5)
PHOSPHATE SERPL-MCNC: 2.4 MG/DL — LOW (ref 2.5–4.5)
PHOSPHATE SERPL-MCNC: 2.6 MG/DL — SIGNIFICANT CHANGE UP (ref 2.5–4.5)
PHOSPHATE SERPL-MCNC: 2.6 MG/DL — SIGNIFICANT CHANGE UP (ref 2.5–4.5)
PLATELET # BLD AUTO: 273 K/UL — SIGNIFICANT CHANGE UP (ref 150–400)
PLATELET # BLD AUTO: 273 K/UL — SIGNIFICANT CHANGE UP (ref 150–400)
POTASSIUM SERPL-MCNC: 3.2 MMOL/L — LOW (ref 3.5–5.3)
POTASSIUM SERPL-MCNC: 3.2 MMOL/L — LOW (ref 3.5–5.3)
POTASSIUM SERPL-MCNC: 3.7 MMOL/L — SIGNIFICANT CHANGE UP (ref 3.5–5.3)
POTASSIUM SERPL-MCNC: 3.7 MMOL/L — SIGNIFICANT CHANGE UP (ref 3.5–5.3)
POTASSIUM SERPL-SCNC: 3.2 MMOL/L — LOW (ref 3.5–5.3)
POTASSIUM SERPL-SCNC: 3.2 MMOL/L — LOW (ref 3.5–5.3)
POTASSIUM SERPL-SCNC: 3.7 MMOL/L — SIGNIFICANT CHANGE UP (ref 3.5–5.3)
POTASSIUM SERPL-SCNC: 3.7 MMOL/L — SIGNIFICANT CHANGE UP (ref 3.5–5.3)
RBC # BLD: 4.53 M/UL — SIGNIFICANT CHANGE UP (ref 4.2–5.8)
RBC # BLD: 4.53 M/UL — SIGNIFICANT CHANGE UP (ref 4.2–5.8)
RBC # FLD: 13.1 % — SIGNIFICANT CHANGE UP (ref 10.3–14.5)
RBC # FLD: 13.1 % — SIGNIFICANT CHANGE UP (ref 10.3–14.5)
SODIUM SERPL-SCNC: 139 MMOL/L — SIGNIFICANT CHANGE UP (ref 135–145)
SODIUM SERPL-SCNC: 139 MMOL/L — SIGNIFICANT CHANGE UP (ref 135–145)
SODIUM SERPL-SCNC: 141 MMOL/L — SIGNIFICANT CHANGE UP (ref 135–145)
SODIUM SERPL-SCNC: 141 MMOL/L — SIGNIFICANT CHANGE UP (ref 135–145)
WBC # BLD: 5.75 K/UL — SIGNIFICANT CHANGE UP (ref 3.8–10.5)
WBC # BLD: 5.75 K/UL — SIGNIFICANT CHANGE UP (ref 3.8–10.5)
WBC # FLD AUTO: 5.75 K/UL — SIGNIFICANT CHANGE UP (ref 3.8–10.5)
WBC # FLD AUTO: 5.75 K/UL — SIGNIFICANT CHANGE UP (ref 3.8–10.5)

## 2023-10-19 PROCEDURE — 99238 HOSP IP/OBS DSCHRG MGMT 30/<: CPT

## 2023-10-19 PROCEDURE — 93005 ELECTROCARDIOGRAM TRACING: CPT

## 2023-10-19 PROCEDURE — 83690 ASSAY OF LIPASE: CPT

## 2023-10-19 PROCEDURE — 96368 THER/DIAG CONCURRENT INF: CPT

## 2023-10-19 PROCEDURE — 74177 CT ABD & PELVIS W/CONTRAST: CPT | Mod: MA

## 2023-10-19 PROCEDURE — 99285 EMERGENCY DEPT VISIT HI MDM: CPT

## 2023-10-19 PROCEDURE — 86900 BLOOD TYPING SEROLOGIC ABO: CPT

## 2023-10-19 PROCEDURE — 85027 COMPLETE CBC AUTOMATED: CPT

## 2023-10-19 PROCEDURE — 81001 URINALYSIS AUTO W/SCOPE: CPT

## 2023-10-19 PROCEDURE — 96365 THER/PROPH/DIAG IV INF INIT: CPT

## 2023-10-19 PROCEDURE — 80053 COMPREHEN METABOLIC PANEL: CPT

## 2023-10-19 PROCEDURE — 86901 BLOOD TYPING SEROLOGIC RH(D): CPT

## 2023-10-19 PROCEDURE — 99233 SBSQ HOSP IP/OBS HIGH 50: CPT

## 2023-10-19 PROCEDURE — 80048 BASIC METABOLIC PNL TOTAL CA: CPT

## 2023-10-19 PROCEDURE — 85520 HEPARIN ASSAY: CPT

## 2023-10-19 PROCEDURE — 85730 THROMBOPLASTIN TIME PARTIAL: CPT

## 2023-10-19 PROCEDURE — 85025 COMPLETE CBC W/AUTO DIFF WBC: CPT

## 2023-10-19 PROCEDURE — 84100 ASSAY OF PHOSPHORUS: CPT

## 2023-10-19 PROCEDURE — 96375 TX/PRO/DX INJ NEW DRUG ADDON: CPT

## 2023-10-19 PROCEDURE — 83735 ASSAY OF MAGNESIUM: CPT

## 2023-10-19 PROCEDURE — 85610 PROTHROMBIN TIME: CPT

## 2023-10-19 PROCEDURE — 36415 COLL VENOUS BLD VENIPUNCTURE: CPT

## 2023-10-19 PROCEDURE — 86850 RBC ANTIBODY SCREEN: CPT

## 2023-10-19 RX ORDER — APIXABAN 2.5 MG/1
5 TABLET, FILM COATED ORAL EVERY 12 HOURS
Refills: 0 | Status: DISCONTINUED | OUTPATIENT
Start: 2023-10-19 | End: 2023-10-19

## 2023-10-19 RX ORDER — METRONIDAZOLE 500 MG
500 TABLET ORAL EVERY 8 HOURS
Refills: 0 | Status: DISCONTINUED | OUTPATIENT
Start: 2023-10-19 | End: 2023-10-19

## 2023-10-19 RX ORDER — MAGNESIUM SULFATE 500 MG/ML
1 VIAL (ML) INJECTION ONCE
Refills: 0 | Status: COMPLETED | OUTPATIENT
Start: 2023-10-19 | End: 2023-10-19

## 2023-10-19 RX ORDER — POTASSIUM CHLORIDE 20 MEQ
40 PACKET (EA) ORAL ONCE
Refills: 0 | Status: COMPLETED | OUTPATIENT
Start: 2023-10-19 | End: 2023-10-19

## 2023-10-19 RX ORDER — CEFPODOXIME PROXETIL 100 MG
1 TABLET ORAL
Qty: 24 | Refills: 0
Start: 2023-10-19 | End: 2023-10-30

## 2023-10-19 RX ORDER — APIXABAN 2.5 MG/1
1 TABLET, FILM COATED ORAL
Qty: 0 | Refills: 0 | DISCHARGE
Start: 2023-10-19

## 2023-10-19 RX ORDER — METRONIDAZOLE 500 MG
1 TABLET ORAL
Qty: 36 | Refills: 0
Start: 2023-10-19 | End: 2023-10-30

## 2023-10-19 RX ORDER — POTASSIUM CHLORIDE 20 MEQ
40 PACKET (EA) ORAL
Refills: 0 | Status: DISCONTINUED | OUTPATIENT
Start: 2023-10-19 | End: 2023-10-19

## 2023-10-19 RX ORDER — ACETAMINOPHEN 500 MG
650 TABLET ORAL EVERY 6 HOURS
Refills: 0 | Status: DISCONTINUED | OUTPATIENT
Start: 2023-10-19 | End: 2023-10-19

## 2023-10-19 RX ORDER — CEFPODOXIME PROXETIL 100 MG
200 TABLET ORAL EVERY 12 HOURS
Refills: 0 | Status: DISCONTINUED | OUTPATIENT
Start: 2023-10-19 | End: 2023-10-19

## 2023-10-19 RX ORDER — POTASSIUM CHLORIDE 20 MEQ
10 PACKET (EA) ORAL
Refills: 0 | Status: COMPLETED | OUTPATIENT
Start: 2023-10-19 | End: 2023-10-19

## 2023-10-19 RX ADMIN — Medication 200 MILLIGRAM(S): at 10:28

## 2023-10-19 RX ADMIN — APIXABAN 5 MILLIGRAM(S): 2.5 TABLET, FILM COATED ORAL at 10:28

## 2023-10-19 RX ADMIN — Medication 40 MILLIEQUIVALENT(S): at 08:49

## 2023-10-19 RX ADMIN — Medication 100 GRAM(S): at 08:49

## 2023-10-19 RX ADMIN — Medication 25 MILLIGRAM(S): at 05:24

## 2023-10-19 RX ADMIN — Medication 650 MILLIGRAM(S): at 01:14

## 2023-10-19 RX ADMIN — Medication 650 MILLIGRAM(S): at 02:14

## 2023-10-19 RX ADMIN — Medication 40 MILLIEQUIVALENT(S): at 09:15

## 2023-10-19 RX ADMIN — Medication 100 MILLIGRAM(S): at 04:14

## 2023-10-19 RX ADMIN — Medication 500 MILLIGRAM(S): at 10:29

## 2023-10-19 NOTE — DISCHARGE NOTE NURSING/CASE MANAGEMENT/SOCIAL WORK - PATIENT PORTAL LINK FT
You can access the FollowMyHealth Patient Portal offered by [facility] by registering at the following website: [email or web address]. By joining [employer]’s FollowMyHealth portal, you will also be able to view your health information using other applications (apps) compatible with our system.

## 2023-10-19 NOTE — DISCHARGE NOTE PROVIDER - NSDCFUADDAPPT_GEN_ALL_CORE_FT
Please follow up with Dr. Oh in one-two weeks; you may call the office to make an appointment at your earliest convenience.

## 2023-10-19 NOTE — PROGRESS NOTE ADULT - SUBJECTIVE AND OBJECTIVE BOX
SUBJECTIVE: Pt seen and examined by chief resident. Pt is doing well, resting comfortably on bed. Pain controlled. Clear liquid diet tolerated. Ambulating out of bed. +F/+BM. No nausea or vomiting. No complaints at this time.    Vital Signs Last 24 Hrs  T(C): 36.6 (19 Oct 2023 05:15), Max: 36.8 (18 Oct 2023 17:36)  T(F): 97.9 (19 Oct 2023 05:15), Max: 98.3 (18 Oct 2023 17:36)  HR: 76 (19 Oct 2023 05:15) (73 - 83)  BP: 119/82 (19 Oct 2023 05:15) (111/77 - 144/92)  BP(mean): 88 (19 Oct 2023 00:36) (88 - 111)  RR: 18 (19 Oct 2023 05:15) (17 - 18)  SpO2: 97% (19 Oct 2023 05:15) (94% - 98%)    Parameters below as of 19 Oct 2023 05:15  Patient On (Oxygen Delivery Method): room air        I&O's Summary    18 Oct 2023 07:01  -  19 Oct 2023 07:00  --------------------------------------------------------  IN: 3559 mL / OUT: 1625 mL / NET: 1934 mL        Physical Exam:  General Appearance: Appears well, NAD  Pulmonary: Nonlabored breathing, no respiratory distress  Abdomen: Soft, nondisteded, minimal tenderness in the LLQ, overall much improved  Extremities: WWP, SCD's in place     LABS:                        12.9   5.75  )-----------( 273      ( 19 Oct 2023 05:30 )             39.0     10-19    139  |  105  |  6<L>  ----------------------------<  109<H>  3.2<L>   |  23  |  0.63    Ca    8.6      19 Oct 2023 05:30  Phos  2.6     10-19  Mg     1.8     10-19    TPro  7.3  /  Alb  3.7  /  TBili  0.7  /  DBili  x   /  AST  16  /  ALT  21  /  AlkPhos  95  10-17    PT/INR - ( 18 Oct 2023 05:30 )   PT: 14.5 sec;   INR: 1.28          PTT - ( 19 Oct 2023 05:30 )  PTT:64.1 sec  Urinalysis Basic - ( 19 Oct 2023 05:30 )    Color: x / Appearance: x / SG: x / pH: x  Gluc: 109 mg/dL / Ketone: x  / Bili: x / Urobili: x   Blood: x / Protein: x / Nitrite: x   Leuk Esterase: x / RBC: x / WBC x   Sq Epi: x / Non Sq Epi: x / Bacteria: x      
SUBJECTIVE: Patient seen and examined bedside by chief resident. pain improved since admission. feels slightly bloated but attributes to not moving around as much.     cefTRIAXone   IVPB 1000 milliGRAM(s) IV Intermittent every 24 hours  heparin  Infusion 1400 Unit(s)/Hr IV Continuous <Continuous>  metoprolol succinate ER 25 milliGRAM(s) Oral daily  metroNIDAZOLE  IVPB 500 milliGRAM(s) IV Intermittent every 8 hours      Vital Signs Last 24 Hrs  T(C): 37 (18 Oct 2023 04:55), Max: 37 (18 Oct 2023 04:55)  T(F): 98.6 (18 Oct 2023 04:55), Max: 98.6 (18 Oct 2023 04:55)  HR: 74 (18 Oct 2023 03:54) (70 - 92)  BP: 117/74 (18 Oct 2023 03:54) (114/78 - 127/83)  BP(mean): 91 (18 Oct 2023 03:54) (91 - 91)  RR: 17 (18 Oct 2023 03:54) (16 - 18)  SpO2: 94% (18 Oct 2023 03:54) (94% - 98%)    Parameters below as of 18 Oct 2023 03:54  Patient On (Oxygen Delivery Method): room air      I&O's Detail      General: NAD, resting comfortably in bed  C/V: NSR  Pulm: Nonlabored breathing, no respiratory distress  Abd: soft, mild distention, LLQ TTP  Extrem: WWP, no edema, SCDs in place        LABS:                        13.0   10.12 )-----------( 227      ( 18 Oct 2023 05:30 )             39.1     10-17    141  |  101  |  12  ----------------------------<  101<H>  4.0   |  28  |  0.72    Ca    9.0      17 Oct 2023 16:04    TPro  7.3  /  Alb  3.7  /  TBili  0.7  /  DBili  x   /  AST  16  /  ALT  21  /  AlkPhos  95  10-17    PT/INR - ( 18 Oct 2023 05:30 )   PT: 14.5 sec;   INR: 1.28          PTT - ( 18 Oct 2023 05:30 )  PTT:106.8 sec  Urinalysis Basic - ( 17 Oct 2023 17:15 )    Color: Yellow / Appearance: Clear / S.010 / pH: x  Gluc: x / Ketone: 15 mg/dL  / Bili: Negative / Urobili: 1.0 E.U./dL   Blood: x / Protein: Trace mg/dL / Nitrite: NEGATIVE   Leuk Esterase: NEGATIVE / RBC: < 5 /HPF / WBC < 5 /HPF   Sq Epi: x / Non Sq Epi: x / Bacteria: Present /HPF        RADIOLOGY & ADDITIONAL STUDIES:  
  Patient is a 58y old  Male who presents with a chief complaint of Acute sigmoid diverticulitis (19 Oct 2023 10:10)      INTERVAL HPI/OVERNIGHT EVENTS: offers no new complaints; current symptoms resolving    MEDICATIONS  (STANDING):  apixaban 5 milliGRAM(s) Oral every 12 hours  atorvastatin 80 milliGRAM(s) Oral at bedtime  cefpodoxime 200 milliGRAM(s) Oral every 12 hours  influenza   Vaccine 0.5 milliLiter(s) IntraMuscular once  metoprolol succinate ER 25 milliGRAM(s) Oral daily  metroNIDAZOLE    Tablet 500 milliGRAM(s) Oral every 8 hours    MEDICATIONS  (PRN):  acetaminophen     Tablet .. 650 milliGRAM(s) Oral every 6 hours PRN Mild Pain (1 - 3)  melatonin 5 milliGRAM(s) Oral at bedtime PRN Insomnia  ondansetron Injectable 4 milliGRAM(s) IV Push every 6 hours PRN Nausea and/or Vomiting      __________________________________________________  REVIEW OF SYSTEMS:    CONSTITUTIONAL: No fever,   EYES: no acute visual disturbances  NECK: No pain or stiffness  RESPIRATORY: No cough; No shortness of breath  CARDIOVASCULAR: No chest pain, no palpitations  GASTROINTESTINAL: No pain. No nausea or vomiting; No diarrhea   NEUROLOGICAL: No headache or numbness, no tremors  MUSCULOSKELETAL: No joint pain, no muscle pain  GENITOURINARY: no dysuria, no frequency, no hesitancy  PSYCHIATRY: no depression , no anxiety  ALL OTHER  ROS negative        Vital Signs Last 24 Hrs  T(C): 36.3 (19 Oct 2023 12:21), Max: 36.8 (18 Oct 2023 17:36)  T(F): 97.3 (19 Oct 2023 12:21), Max: 98.3 (18 Oct 2023 17:36)  HR: 76 (19 Oct 2023 12:21) (70 - 83)  BP: 130/94 (19 Oct 2023 12:21) (112/75 - 144/92)  BP(mean): 88 (19 Oct 2023 00:36) (88 - 106)  RR: 18 (19 Oct 2023 12:21) (17 - 18)  SpO2: 95% (19 Oct 2023 12:21) (94% - 98%)    Parameters below as of 19 Oct 2023 12:21  Patient On (Oxygen Delivery Method): room air        ________________________________________________  PHYSICAL EXAM:  GENERAL: NAD  HEENT: Normocephalic;  conjunctivae and sclerae clear; moist mucous membranes;   NECK : supple  CHEST/LUNG: Clear to auscultation bilaterally with good air entry   HEART: S1 S2  regular; no murmurs, gallops or rubs  ABDOMEN: Soft, Nontender, Nondistended; Bowel sounds present  EXTREMITIES: no cyanosis; no edema; no calf tenderness  SKIN: warm and dry; no rash  NERVOUS SYSTEM:  Awake and alert; Oriented  to place, person and time ; no new deficits    _________________________________________________  LABS:                        12.9   5.75  )-----------( 273      ( 19 Oct 2023 05:30 )             39.0     10-19    139  |  105  |  6<L>  ----------------------------<  109<H>  3.2<L>   |  23  |  0.63    Ca    8.6      19 Oct 2023 05:30  Phos  2.6     10-19  Mg     1.8     10-19    TPro  7.3  /  Alb  3.7  /  TBili  0.7  /  DBili  x   /  AST  16  /  ALT  21  /  AlkPhos  95  10-17    PT/INR - ( 18 Oct 2023 05:30 )   PT: 14.5 sec;   INR: 1.28          PTT - ( 19 Oct 2023 05:30 )  PTT:64.1 sec  Urinalysis Basic - ( 19 Oct 2023 05:30 )    Color: x / Appearance: x / SG: x / pH: x  Gluc: 109 mg/dL / Ketone: x  / Bili: x / Urobili: x   Blood: x / Protein: x / Nitrite: x   Leuk Esterase: x / RBC: x / WBC x   Sq Epi: x / Non Sq Epi: x / Bacteria: x      CAPILLARY BLOOD GLUCOSE            RADIOLOGY & ADDITIONAL TESTS:      Plan of care was discussed with patient and /or primary care giver; all questions and concerns were addressed and care was aligned with patient's wishes.

## 2023-10-19 NOTE — DISCHARGE NOTE PROVIDER - CARE PROVIDER_API CALL
Pepper Oh  Surgery  [address] [place] [address], [address]  [place] [address]  Phone: [phone]  Fax: [phone]  Follow Up Time:

## 2023-10-19 NOTE — DISCHARGE NOTE NURSING/CASE MANAGEMENT/SOCIAL WORK - NSDCPEFALRISK_GEN_ALL_CORE
For information on Fall & Injury Prevention, visit: [email or web address] OR  [email or web address] OR  https://www.cdc.gov/steadi/patient.html

## 2023-10-19 NOTE — PROGRESS NOTE ADULT - ASSESSMENT
57 yo M w/ PMHx of CAD s/p 1 stent, portal vein thrombosis on Eliquis, no PSHx presenting for 3 days of worsening lower abdominal pain. Pt admitted for acute sigmoid diverticulitis Hinchey 1a     acute sigmoid diverticulitis   Leukocytosis 16 k on admission -> DT 10 k   CT A/P notable for perisigmoid fat stranding and adjacent phlegmon  was on CTX/flagyl - transition to cefpodoxime and flagyl for dc   diet and rest of the care per primary team     CAD   Chronic PVT   cw statin   pt on eliquis -        Hypokalemia and hypophosphatemia   k 3.4   phos 1.9- improved after replacement   rec  replacement for potassium       HTN   cw toprol     dvt ppx   eliquis

## 2023-10-19 NOTE — PROGRESS NOTE ADULT - ATTENDING COMMENTS
Patient seen, agree with above. Well appearing, tolerating diet, will d/c today.
Pt seen, agree with above. Appears well, reports decreased pain since ED overnight. WBC 10, sarath CLD, if continues on current course will trial PO antibiotics and reg diet tomorrow.

## 2023-10-19 NOTE — DISCHARGE NOTE PROVIDER - NSDCMRMEDTOKEN_GEN_ALL_CORE_FT
atorvastatin 80 mg oral tablet: 1 tab(s) orally once a day  Eliquis Starter Pack for Treatment of DVT and PE 5 mg oral tablet: 2 tab(s) orally 2 times a day from (8/26-8/30), then 1 tab 2 times a day starting 8/31  Toprol-XL 25 mg oral tablet, extended release: 1 tab(s) orally once a day    atorvastatin 80 mg oral tablet: 1 tab(s) orally once a day  cefpodoxime 200 mg oral tablet: 1 tab(s) orally 2 times a day  Eliquis 5 mg oral tablet: 1 tab(s) orally every 12 hours  metroNIDAZOLE 500 mg oral tablet: 1 tab(s) orally every 8 hours  Toprol-XL 25 mg oral tablet, extended release: 1 tab(s) orally once a day

## 2023-10-19 NOTE — PROGRESS NOTE ADULT - ASSESSMENT
59 yo M w/ PMHx of CAD s/p 1 stent, portal vein thrombosis on Eliquis, no PSHx presenting for 3 days of worsening lower abdominal pain without associated constitutional sxs. Abdomen soft, non-distended and mildly tender without guarding or rebound in LLQ. Patient hemodynamically normal and afebrile. Leukocytosis (16) with left shift. CT A/P notable for perisigmoid fat stranding and adjacent phlegmon. Clinical picture in keeping with acute sigmoid diverticulitis Hinchey 1a. Will admit for further management     - low res/IVF   - transition to PO abx  - transition to home eliquis  - STEFFANY  - Pain/nausea control   - OOBA/IS/SCDs  - Home meds as appropriate  - AM Labs

## 2023-10-19 NOTE — DISCHARGE NOTE PROVIDER - HOSPITAL COURSE
58M w/ PMHx of CAD s/p 1 stent, portal vein thrombosis on Eliquis, no PSHx presented for 3 days of worsening lower abdominal pain without associated constitutional sxs. Abdomen soft, non-distended and mildly tender without guarding or rebound in LLQ. Patient hemodynamically normal and afebrile. Leukocytosis (16) with left shift. CT A/P notable for perisigmoid fat stranding and adjacent phlegmon. Clinical picture acute sigmoid diverticulitis Hinchey 1a. Pt admitted for bowel rest, IVF, IV abx, and placed on hgtt for potal vein thrombosis. Pt improved clinically, WBC downtrended, abdominal pain improved drastically, patients diet was advanced and tolerated, and pt was transitioned back to oral eliquis. The rest of his hospital course was unremarkable and on day of discharge he was stable for dc home with PO abx.

## 2023-10-19 NOTE — DISCHARGE NOTE PROVIDER - NSDCFUADDINST_GEN_ALL_CORE_FT
Please follow up with Dr. Oh in one week; you may call the office to make an appointment at your earliest convenience.    You have been prescribed oral antibiotics. Antibiotics are used to treat bacterial infections. Please be sure to complete the entire course as directed.  1. Cefpodoxime 200mg every 12 hours  2. Flagyl 500mg every 8 hours  Common side effects of cefpodoxime include: nausea, vomiting, diarrhea, stomach upset, abdominal cramping, fatigue, headaches  Common side effects of flagyl include: nausea, vomiting, loss of appetite, diarrhea, constipation, stomach upset, weakness, headaches    General Discharge Instructions:  Please resume all regular home medications unless specifically advised not to take a particular medication. Also, please take any new medications as prescribed.  Please get plenty of rest, continue to ambulate several times per day, and drink adequate amounts of fluids.   Please follow-up with your surgeon and Primary Care Provider (PCP) in 1-2 weeks.    Warning Signs:  Please call your doctor if you experience the following:  *You experience new chest pain, pressure, squeezing or tightness.  *New or worsening cough, shortness of breath, or wheeze.  *If you are vomiting and cannot keep down fluids or your medications.  *You are getting dehydrated due to continued vomiting, diarrhea, or other reasons. Signs of dehydration include dry mouth, rapid heartbeat, or feeling dizzy or faint when standing.  *You see blood or dark/black material when you vomit or have a bowel movement.  *You experience burning when you urinate, have blood in your urine, or experience a discharge.  *Your pain is not improving within 8-12 hours or is not gone within 24 hours. Call or return immediately if your pain is getting worse, changes location, or moves to your chest or back.  *You have shaking chills, or fever greater than 101.5 degrees Fahrenheit or 38 degrees Celsius.  *Any change in your symptoms, or any new symptoms that concern you.

## 2023-10-19 NOTE — CHART NOTE - NSCHARTNOTEFT_GEN_A_CORE
Pt seen in bed on 9UR. Pt tolerating CLD, denies nausea/vomiting. Pt endorses passing flatus and BMs and noted pain Is much improved since admission. On exam, abdomen is soft, mild ttp LLQ, nondistended and nonperitonitic.

## 2023-10-23 PROBLEM — I81 PORTAL VEIN THROMBOSIS: Chronic | Status: ACTIVE | Noted: 2023-10-18

## 2023-10-26 DIAGNOSIS — Z79.899 OTHER LONG TERM (CURRENT) DRUG THERAPY: ICD-10-CM

## 2023-10-26 DIAGNOSIS — Z79.01 LONG TERM (CURRENT) USE OF ANTICOAGULANTS: ICD-10-CM

## 2023-10-26 DIAGNOSIS — I25.10 ATHEROSCLEROTIC HEART DISEASE OF NATIVE CORONARY ARTERY WITHOUT ANGINA PECTORIS: ICD-10-CM

## 2023-10-26 DIAGNOSIS — K52.9 NONINFECTIVE GASTROENTERITIS AND COLITIS, UNSPECIFIED: ICD-10-CM

## 2023-10-26 DIAGNOSIS — Z91.018 ALLERGY TO OTHER FOODS: ICD-10-CM

## 2023-10-26 DIAGNOSIS — E83.39 OTHER DISORDERS OF PHOSPHORUS METABOLISM: ICD-10-CM

## 2023-10-26 DIAGNOSIS — I82.891 CHRONIC EMBOLISM AND THROMBOSIS OF OTHER SPECIFIED VEINS: ICD-10-CM

## 2023-10-26 DIAGNOSIS — E87.6 HYPOKALEMIA: ICD-10-CM

## 2023-10-26 DIAGNOSIS — K57.32 DIVERTICULITIS OF LARGE INTESTINE WITHOUT PERFORATION OR ABSCESS WITHOUT BLEEDING: ICD-10-CM

## 2023-10-26 DIAGNOSIS — Z87.891 PERSONAL HISTORY OF NICOTINE DEPENDENCE: ICD-10-CM

## 2023-10-26 DIAGNOSIS — F12.90 CANNABIS USE, UNSPECIFIED, UNCOMPLICATED: ICD-10-CM

## 2023-10-26 DIAGNOSIS — F10.90 ALCOHOL USE, UNSPECIFIED, UNCOMPLICATED: ICD-10-CM

## 2023-10-26 DIAGNOSIS — I10 ESSENTIAL (PRIMARY) HYPERTENSION: ICD-10-CM

## 2023-10-26 DIAGNOSIS — Z95.5 PRESENCE OF CORONARY ANGIOPLASTY IMPLANT AND GRAFT: ICD-10-CM

## 2023-10-26 DIAGNOSIS — Z79.82 LONG TERM (CURRENT) USE OF ASPIRIN: ICD-10-CM

## 2023-10-26 DIAGNOSIS — Z86.010 PERSONAL HISTORY OF COLONIC POLYPS: ICD-10-CM

## 2023-10-26 DIAGNOSIS — K57.20 DIVERTICULITIS OF LARGE INTESTINE WITH PERFORATION AND ABSCESS WITHOUT BLEEDING: ICD-10-CM

## 2023-11-03 ENCOUNTER — APPOINTMENT (OUTPATIENT)
Dept: INTERNAL MEDICINE | Facility: CLINIC | Age: 58
End: 2023-11-03
Payer: COMMERCIAL

## 2023-11-03 VITALS
WEIGHT: 164 LBS | SYSTOLIC BLOOD PRESSURE: 120 MMHG | HEIGHT: 70 IN | OXYGEN SATURATION: 98 % | HEART RATE: 71 BPM | DIASTOLIC BLOOD PRESSURE: 79 MMHG | TEMPERATURE: 98.1 F | BODY MASS INDEX: 23.48 KG/M2

## 2023-11-03 DIAGNOSIS — K57.32 DIVERTICULITIS OF LARGE INTESTINE W/OUT PERFORATION OR ABSCESS W/OUT BLEEDING: ICD-10-CM

## 2023-11-03 DIAGNOSIS — H43.399 OTHER VITREOUS OPACITIES, UNSPECIFIED EYE: ICD-10-CM

## 2023-11-03 PROCEDURE — 99214 OFFICE O/P EST MOD 30 MIN: CPT

## 2023-11-03 RX ORDER — ATORVASTATIN CALCIUM 80 MG/1
80 TABLET, FILM COATED ORAL
Qty: 90 | Refills: 0 | Status: ACTIVE | COMMUNITY
Start: 2023-02-07

## 2023-11-03 RX ORDER — ATORVASTATIN CALCIUM 40 MG/1
40 TABLET, FILM COATED ORAL DAILY
Qty: 90 | Refills: 0 | Status: DISCONTINUED | COMMUNITY
Start: 2019-09-27 | End: 2023-11-03

## 2024-04-01 ENCOUNTER — RX RENEWAL (OUTPATIENT)
Age: 59
End: 2024-04-01

## 2024-04-18 NOTE — PROGRESS NOTE ADULT - PROBLEM SELECTOR PROBLEM 2
Visual Acuity (Snellen - Linear)         Right Left    Dist cc 20/50 -2 20/20    Dist ph cc NI       Correction: Glasses          Tonometry       Tonometry (Goldmann Applanation, 10:26 AM)         Right Left    Pressure 09 09                  Assessment/Plan   Last dilated:  11/30/23     1.  Low Tension Glaucoma OD>OS:  /540 Tm by history <20 (maybe 18-19).  Pt has had glaucoma for >30 years (NICOLE Whiteside was treating physician). IOP is great.  HVF OS essentially back to baseline.  HVFs have significant LTF, but RNFLs have been very stable      Plan:  cont latanoprost OU QHS             cont brimonidine 0.2% OU BID             cont timolol 0.5% OU Qam             f/u 4 months     2.  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  VA stable      Plan:  as per optometry   
Prophylactic measure

## 2024-06-05 ENCOUNTER — RX RENEWAL (OUTPATIENT)
Age: 59
End: 2024-06-05

## 2024-06-05 RX ORDER — APIXABAN 5 MG/1
5 TABLET, FILM COATED ORAL
Qty: 90 | Refills: 0 | Status: ACTIVE | COMMUNITY
Start: 2022-08-26 | End: 1900-01-01

## 2024-08-02 ENCOUNTER — APPOINTMENT (OUTPATIENT)
Dept: INTERNAL MEDICINE | Facility: CLINIC | Age: 59
End: 2024-08-02

## 2024-08-07 NOTE — PLAN
[FreeTextEntry1] :    ====== chart reviewed in detail since last visit ========== [] f/u heme for hypercoagulable testing [] f/u heme for PVT, cards [] f/u derm for full skin check [] see optho

## 2024-08-07 NOTE — PHYSICAL EXAM
[No Acute Distress] : no acute distress [Well Nourished] : well nourished [Well Developed] : well developed [Well-Appearing] : well-appearing [Normal Voice/Communication] : normal voice/communication [Normal Sclera/Conjunctiva] : normal sclera/conjunctiva [Normal Outer Ear/Nose] : the outer ears and nose were normal in appearance [No JVD] : no jugular venous distention [No Respiratory Distress] : no respiratory distress  [No Accessory Muscle Use] : no accessory muscle use [Soft] : abdomen soft [Non-distended] : non-distended [No HSM] : no HSM [Normal Bowel Sounds] : normal bowel sounds [No Joint Swelling] : no joint swelling [Grossly Normal Strength/Tone] : grossly normal strength/tone [Coordination Grossly Intact] : coordination grossly intact [Normal Gait] : normal gait [Speech Grossly Normal] : speech grossly normal [Memory Grossly Normal] : memory grossly normal [Normal Affect] : the affect was normal [Normal Mood] : the mood was normal [Normal Insight/Judgement] : insight and judgment were intact [de-identified] : tender diffusely worse in llq with some rebound nad guarding

## 2024-08-07 NOTE — HISTORY OF PRESENT ILLNESS
[de-identified] :  59 yo M w/ h/o former tobacco use, CAD c/b nstemi s/p VENKATA in LCx , gerd here for  abd pain  - havign loose stool but feel need to strain. lowre abd pain since sunday. one vomiting on sunday. had dental implant done recently.  - some headche - little constipated

## 2024-08-07 NOTE — HISTORY OF PRESENT ILLNESS
[de-identified] :  59 yo M w/ h/o former tobacco use, CAD c/b nstemi s/p VENKATA in LCx , gerd here for  abd pain  - havign loose stool but feel need to strain. lowre abd pain since sunday. one vomiting on sunday. had dental implant done recently.  - some headche - little constipated

## 2024-08-07 NOTE — PHYSICAL EXAM
[No Acute Distress] : no acute distress [Well Nourished] : well nourished [Well Developed] : well developed [Well-Appearing] : well-appearing [Normal Voice/Communication] : normal voice/communication [Normal Sclera/Conjunctiva] : normal sclera/conjunctiva [Normal Outer Ear/Nose] : the outer ears and nose were normal in appearance [No JVD] : no jugular venous distention [No Respiratory Distress] : no respiratory distress  [No Accessory Muscle Use] : no accessory muscle use [Soft] : abdomen soft [Non-distended] : non-distended [No HSM] : no HSM [Normal Bowel Sounds] : normal bowel sounds [No Joint Swelling] : no joint swelling [Grossly Normal Strength/Tone] : grossly normal strength/tone [Coordination Grossly Intact] : coordination grossly intact [Normal Gait] : normal gait [Speech Grossly Normal] : speech grossly normal [Memory Grossly Normal] : memory grossly normal [Normal Affect] : the affect was normal [Normal Mood] : the mood was normal [Normal Insight/Judgement] : insight and judgment were intact [de-identified] : tender diffusely worse in llq with some rebound nad guarding

## 2024-09-01 NOTE — DISCHARGE NOTE NURSING/CASE MANAGEMENT/SOCIAL WORK - NSDCPEFALRISK_GEN_ALL_CORE
For information on Fall & Injury Prevention, visit: [email or web address] OR  [email or web address] OR  https://www.cdc.gov/steadi/patient.html
No.

## 2024-10-07 NOTE — DISCHARGE NOTE PROVIDER - DATE OF DISCHARGE SERVICE:
Pt urinated on bed and soiled sheets, and on floor; as a result, unable to obtain urine drug screen at this time. Pt cleaned up, sheets changed. Urinal at bedside. Apple juice provided per pt request.   Currently pt resting on cart with eyes closed. Respirations steady & unlabored. Appears in NAD. Sitter at bedside.     25-Aug-2022